# Patient Record
Sex: MALE | Race: WHITE | Employment: FULL TIME | URBAN - METROPOLITAN AREA
[De-identification: names, ages, dates, MRNs, and addresses within clinical notes are randomized per-mention and may not be internally consistent; named-entity substitution may affect disease eponyms.]

---

## 2017-01-27 ENCOUNTER — OFFICE VISIT (OUTPATIENT)
Dept: FAMILY MEDICINE CLINIC | Facility: CLINIC | Age: 42
End: 2017-01-27

## 2017-01-27 VITALS
BODY MASS INDEX: 27.49 KG/M2 | HEIGHT: 70 IN | SYSTOLIC BLOOD PRESSURE: 118 MMHG | HEART RATE: 68 BPM | WEIGHT: 192 LBS | TEMPERATURE: 98 F | OXYGEN SATURATION: 98 % | RESPIRATION RATE: 18 BRPM | DIASTOLIC BLOOD PRESSURE: 80 MMHG

## 2017-01-27 DIAGNOSIS — J01.00 ACUTE NON-RECURRENT MAXILLARY SINUSITIS: ICD-10-CM

## 2017-01-27 DIAGNOSIS — J02.9 SORE THROAT: Primary | ICD-10-CM

## 2017-01-27 LAB — CONTROL LINE PRESENT WITH A CLEAR BACKGROUND (YES/NO): YES YES/NO

## 2017-01-27 PROCEDURE — 99213 OFFICE O/P EST LOW 20 MIN: CPT | Performed by: NURSE PRACTITIONER

## 2017-01-27 PROCEDURE — 87880 STREP A ASSAY W/OPTIC: CPT | Performed by: NURSE PRACTITIONER

## 2017-01-27 RX ORDER — AMOXICILLIN AND CLAVULANATE POTASSIUM 875; 125 MG/1; MG/1
1 TABLET, FILM COATED ORAL 2 TIMES DAILY
Qty: 20 TABLET | Refills: 0 | Status: SHIPPED | OUTPATIENT
Start: 2017-01-27 | End: 2017-02-06

## 2017-04-28 PROBLEM — M77.8 CAPSULITIS OF FOOT, LEFT: Status: ACTIVE | Noted: 2017-04-28

## 2018-03-01 ENCOUNTER — OFFICE VISIT (OUTPATIENT)
Dept: FAMILY MEDICINE CLINIC | Facility: CLINIC | Age: 43
End: 2018-03-01

## 2018-03-01 ENCOUNTER — LAB ENCOUNTER (OUTPATIENT)
Dept: LAB | Age: 43
End: 2018-03-01
Attending: FAMILY MEDICINE
Payer: COMMERCIAL

## 2018-03-01 VITALS
HEART RATE: 60 BPM | BODY MASS INDEX: 29.84 KG/M2 | DIASTOLIC BLOOD PRESSURE: 76 MMHG | WEIGHT: 199.19 LBS | HEIGHT: 68.5 IN | SYSTOLIC BLOOD PRESSURE: 114 MMHG | RESPIRATION RATE: 16 BRPM

## 2018-03-01 DIAGNOSIS — E79.0 HYPERURICEMIA: ICD-10-CM

## 2018-03-01 DIAGNOSIS — Z00.00 ROUTINE GENERAL MEDICAL EXAMINATION AT A HEALTH CARE FACILITY: Primary | ICD-10-CM

## 2018-03-01 DIAGNOSIS — Z12.5 ENCOUNTER FOR SCREENING FOR MALIGNANT NEOPLASM OF PROSTATE: ICD-10-CM

## 2018-03-01 DIAGNOSIS — Z00.00 LABORATORY EXAM ORDERED AS PART OF ROUTINE GENERAL MEDICAL EXAMINATION: ICD-10-CM

## 2018-03-01 LAB
25-HYDROXYVITAMIN D (TOTAL): 23 NG/ML (ref 30–100)
ALBUMIN SERPL-MCNC: 4.3 G/DL (ref 3.5–4.8)
ALP LIVER SERPL-CCNC: 52 U/L (ref 45–117)
ALT SERPL-CCNC: 27 U/L (ref 17–63)
AST SERPL-CCNC: 18 U/L (ref 15–41)
BASOPHILS # BLD AUTO: 0.03 X10(3) UL (ref 0–0.1)
BASOPHILS NFR BLD AUTO: 0.6 %
BILIRUB SERPL-MCNC: 0.5 MG/DL (ref 0.1–2)
BUN BLD-MCNC: 11 MG/DL (ref 8–20)
CALCIUM BLD-MCNC: 9.3 MG/DL (ref 8.3–10.3)
CHLORIDE: 102 MMOL/L (ref 101–111)
CHOLEST SMN-MCNC: 255 MG/DL (ref ?–200)
CO2: 28 MMOL/L (ref 22–32)
COMPLEXED PSA SERPL-MCNC: 0.86 NG/ML (ref 0.01–4)
CREAT BLD-MCNC: 0.91 MG/DL (ref 0.7–1.3)
EOSINOPHIL # BLD AUTO: 0.1 X10(3) UL (ref 0–0.3)
EOSINOPHIL NFR BLD AUTO: 1.9 %
ERYTHROCYTE [DISTWIDTH] IN BLOOD BY AUTOMATED COUNT: 11.9 % (ref 11.5–16)
FREE T4: 1 NG/DL (ref 0.9–1.8)
GLUCOSE BLD-MCNC: 95 MG/DL (ref 70–99)
HCT VFR BLD AUTO: 39.4 % (ref 37–53)
HDLC SERPL-MCNC: 60 MG/DL (ref 45–?)
HDLC SERPL: 4.25 {RATIO} (ref ?–4.97)
HGB BLD-MCNC: 13.3 G/DL (ref 13–17)
IMMATURE GRANULOCYTE COUNT: 0.01 X10(3) UL (ref 0–1)
IMMATURE GRANULOCYTE RATIO %: 0.2 %
LDLC SERPL CALC-MCNC: 152 MG/DL (ref ?–130)
LYMPHOCYTES # BLD AUTO: 2.26 X10(3) UL (ref 0.9–4)
LYMPHOCYTES NFR BLD AUTO: 42.6 %
M PROTEIN MFR SERPL ELPH: 8.3 G/DL (ref 6.1–8.3)
MCH RBC QN AUTO: 29.1 PG (ref 27–33.2)
MCHC RBC AUTO-ENTMCNC: 33.8 G/DL (ref 31–37)
MCV RBC AUTO: 86.2 FL (ref 80–99)
MONOCYTES # BLD AUTO: 0.46 X10(3) UL (ref 0.1–1)
MONOCYTES NFR BLD AUTO: 8.7 %
NEUTROPHIL ABS PRELIM: 2.44 X10 (3) UL (ref 1.3–6.7)
NEUTROPHILS # BLD AUTO: 2.44 X10(3) UL (ref 1.3–6.7)
NEUTROPHILS NFR BLD AUTO: 46 %
NONHDLC SERPL-MCNC: 195 MG/DL (ref ?–130)
PLATELET # BLD AUTO: 238 10(3)UL (ref 150–450)
POTASSIUM SERPL-SCNC: 4.4 MMOL/L (ref 3.6–5.1)
RBC # BLD AUTO: 4.57 X10(6)UL (ref 4.3–5.7)
RED CELL DISTRIBUTION WIDTH-SD: 37.8 FL (ref 35.1–46.3)
SODIUM SERPL-SCNC: 138 MMOL/L (ref 136–144)
TRIGL SERPL-MCNC: 213 MG/DL (ref ?–150)
TSI SER-ACNC: 2.86 MIU/ML (ref 0.35–5.5)
URIC ACID: 7.8 MG/DL (ref 2.4–8.7)
VLDLC SERPL CALC-MCNC: 43 MG/DL (ref 5–40)
WBC # BLD AUTO: 5.3 X10(3) UL (ref 4–13)

## 2018-03-01 PROCEDURE — 84439 ASSAY OF FREE THYROXINE: CPT | Performed by: FAMILY MEDICINE

## 2018-03-01 PROCEDURE — 80050 GENERAL HEALTH PANEL: CPT | Performed by: FAMILY MEDICINE

## 2018-03-01 PROCEDURE — 99396 PREV VISIT EST AGE 40-64: CPT | Performed by: FAMILY MEDICINE

## 2018-03-01 PROCEDURE — 82306 VITAMIN D 25 HYDROXY: CPT | Performed by: FAMILY MEDICINE

## 2018-03-01 PROCEDURE — 80061 LIPID PANEL: CPT | Performed by: FAMILY MEDICINE

## 2018-03-01 PROCEDURE — 84153 ASSAY OF PSA TOTAL: CPT | Performed by: FAMILY MEDICINE

## 2018-03-01 PROCEDURE — 36415 COLL VENOUS BLD VENIPUNCTURE: CPT | Performed by: FAMILY MEDICINE

## 2018-03-01 PROCEDURE — 84550 ASSAY OF BLOOD/URIC ACID: CPT | Performed by: FAMILY MEDICINE

## 2018-03-01 RX ORDER — PROPRANOLOL/HYDROCHLOROTHIAZID 40 MG-25MG
1 TABLET ORAL DAILY
COMMUNITY
Start: 2017-11-01 | End: 2019-06-05

## 2018-03-01 NOTE — PATIENT INSTRUCTIONS
SCHEDULING [facility] APPOINTMENTS ONLINE    Lab appointments can now be scheduled online at [email or web address]    · Go to [email or web address]  · In Search type Lab  · Click \"Lab services\"  · Click \"Schedule Your Test Online\"  · Follow the prompts  · If you if a member of your immediate family has had colon cancer, especially if their cancer occurred before they were 48years old. Prostate cancer tests:  The older way of looking for prostate cancer, the rectal exam, is no longer regarded as the best way to s tests, and urine tests. When you have no symptoms of illness, you should discuss the pros and cons of these and other tests with your healthcare provider. Each test involves some expense. What shots do I need?    The following shots are recommended for ad should expect your healthcare provider to advise you regularly on other ways to stay healthy. Some of these may include:   Substance use: Do not use tobacco or illegal drugs.  Avoid using alcohol while driving, swimming, boating, etc.   Diet and exercise: T

## 2018-03-01 NOTE — PROGRESS NOTES
Piotr Anne is a 40year old male who presents for a complete physical exam.   HPI:     No complaints.     Wt Readings from Last 6 Encounters:  03/01/18 : 199 lb 3.2 oz  04/28/17 : 195 lb  03/22/17 : 195 lb 9.6 oz  01/27/17 : 192 lb  12/30/16 : 192 l denies MEIR, wheezing, cough, orthopnea and PND  CARDIOVASCULAR: denies CP, palpitations, rapid or slow heart rate.  Denies CAROLINA/lower extremity swelling  GI: denies abdominal pain,denies heartburn, denies n/v/c/d/change in stools/blood in stool/black stool/c old male who presents for a complete physical exam.       Routine general medical examination at a health care facility  (primary encounter diagnosis)  Laboratory exam ordered as part of routine general medical examination  Encounter for screening for [facility]

## 2018-03-09 ENCOUNTER — TELEPHONE (OUTPATIENT)
Dept: FAMILY MEDICINE CLINIC | Facility: CLINIC | Age: 43
End: 2018-03-09

## 2018-03-09 NOTE — TELEPHONE ENCOUNTER
----- Message from Berenice Pallas, DO sent at 3/8/2018  6:36 PM CST -----  Please call patient: Please have patient make appointment to see me for follow-up on blood test results.   Need to discuss cardiovascular risk factors and cardiovascular risk factor

## 2018-03-09 NOTE — TELEPHONE ENCOUNTER
LM on private voicemail, per signed consent, with test results recommendations. Pt advised to call the office to schedule an appointment to discuss test results.

## 2018-04-02 ENCOUNTER — OFFICE VISIT (OUTPATIENT)
Dept: FAMILY MEDICINE CLINIC | Facility: CLINIC | Age: 43
End: 2018-04-02

## 2018-04-02 VITALS
HEIGHT: 68.5 IN | DIASTOLIC BLOOD PRESSURE: 64 MMHG | RESPIRATION RATE: 16 BRPM | HEART RATE: 64 BPM | WEIGHT: 198.38 LBS | SYSTOLIC BLOOD PRESSURE: 112 MMHG | BODY MASS INDEX: 29.72 KG/M2

## 2018-04-02 DIAGNOSIS — E55.9 VITAMIN D DEFICIENCY: ICD-10-CM

## 2018-04-02 DIAGNOSIS — E66.3 OVERWEIGHT (BMI 25.0-29.9): ICD-10-CM

## 2018-04-02 DIAGNOSIS — E78.2 MIXED HYPERLIPIDEMIA: Primary | ICD-10-CM

## 2018-04-02 PROCEDURE — 99213 OFFICE O/P EST LOW 20 MIN: CPT | Performed by: FAMILY MEDICINE

## 2018-04-02 NOTE — PROGRESS NOTES
Dewayne Rodriguez is a 40year old male.      HPI:       Mixed hyperlipidemia:  Patient had normal lipid panels in the past.  Patient states over the last several months he has been indulgent with his diet and not as physically active as in the past.  He i MEIR/CAROLINA/Orthopnea/PND  CARDIOVASCULAR: Denies CP/palpitations/CAROLINA  VASCULAR: Denies edema, denies claudication type symptoms, varicose veins  GI: Denies abdominal pain/nausea/vomiting/blood in stool/black stool/bloating/constipation/diarrhea  PSYCH: denies (BMI 25.0-29. 9)  Weight loss recommended. Patient counseled on healthy diet and regular exercise. Orders Placed This Encounter      Lipid Panel [E]    Return in about 3 months (around 7/2/2018) for Hyperlipidemia after recheck of blood tests.

## 2018-10-17 ENCOUNTER — APPOINTMENT (OUTPATIENT)
Dept: LAB | Age: 43
End: 2018-10-17
Attending: FAMILY MEDICINE
Payer: COMMERCIAL

## 2018-10-17 DIAGNOSIS — E78.2 MIXED HYPERLIPIDEMIA: ICD-10-CM

## 2018-10-17 PROCEDURE — 80061 LIPID PANEL: CPT | Performed by: FAMILY MEDICINE

## 2018-10-17 PROCEDURE — 36415 COLL VENOUS BLD VENIPUNCTURE: CPT | Performed by: FAMILY MEDICINE

## 2018-11-01 ENCOUNTER — OFFICE VISIT (OUTPATIENT)
Dept: FAMILY MEDICINE CLINIC | Facility: CLINIC | Age: 43
End: 2018-11-01
Payer: COMMERCIAL

## 2018-11-01 VITALS
HEART RATE: 64 BPM | SYSTOLIC BLOOD PRESSURE: 112 MMHG | WEIGHT: 202 LBS | DIASTOLIC BLOOD PRESSURE: 70 MMHG | TEMPERATURE: 98 F | BODY MASS INDEX: 30.62 KG/M2 | HEIGHT: 68 IN

## 2018-11-01 DIAGNOSIS — G89.29 CHRONIC PAIN OF BOTH ANKLES: ICD-10-CM

## 2018-11-01 DIAGNOSIS — E78.2 MIXED HYPERLIPIDEMIA: Primary | ICD-10-CM

## 2018-11-01 DIAGNOSIS — M25.572 CHRONIC PAIN OF BOTH ANKLES: ICD-10-CM

## 2018-11-01 DIAGNOSIS — M79.672 CHRONIC FOOT PAIN, LEFT: ICD-10-CM

## 2018-11-01 DIAGNOSIS — G89.29 CHRONIC FOOT PAIN, RIGHT: ICD-10-CM

## 2018-11-01 DIAGNOSIS — M79.671 CHRONIC FOOT PAIN, RIGHT: ICD-10-CM

## 2018-11-01 DIAGNOSIS — G89.29 CHRONIC FOOT PAIN, LEFT: ICD-10-CM

## 2018-11-01 DIAGNOSIS — M25.571 CHRONIC PAIN OF BOTH ANKLES: ICD-10-CM

## 2018-11-01 PROCEDURE — 99214 OFFICE O/P EST MOD 30 MIN: CPT | Performed by: FAMILY MEDICINE

## 2018-11-01 PROCEDURE — 90686 IIV4 VACC NO PRSV 0.5 ML IM: CPT | Performed by: FAMILY MEDICINE

## 2018-11-01 PROCEDURE — 90471 IMMUNIZATION ADMIN: CPT | Performed by: FAMILY MEDICINE

## 2018-11-01 NOTE — PROGRESS NOTES
Eagle Roca is a 44year old male. HPI:       Hypercholesterolemia:  Family history of stroke in a grandparent. Patient has been working on his diet to help improve abnormal lipids.   Patient states that normally he used to run in the past, tanner unusual skin lesions or rashes   RESPIRATORY: Denies: MEIR/CAROLINA/Cough/Wheeze/Orthopnea/PND  CARDIOVASCULAR: Denies CP/palpitations/CAROLINA  VASCULAR: Denies LE edema, denies claudication type symptoms, varicose veins  NEURO: denies headaches/dizziness/fainting/w pain of both ankles  Left greater than right. Consult Dr. Chaitanya Hanson.    - [address]    3. Chronic foot pain, left  Left greater than right. Consult Dr. Chaitanya Hanson.    - [address]    4.  Chronic foot pain, right  Left greater than righ

## 2019-01-25 ENCOUNTER — APPOINTMENT (OUTPATIENT)
Dept: LAB | Age: 44
End: 2019-01-25
Attending: INTERNAL MEDICINE
Payer: COMMERCIAL

## 2019-01-25 DIAGNOSIS — M25.50 POLYARTHRALGIA: ICD-10-CM

## 2019-01-25 LAB
CRP SERPL-MCNC: 2.01 MG/DL (ref ?–1)
SED RATE-ML: 18 MM/HR (ref 0–12)

## 2019-01-25 PROCEDURE — 86140 C-REACTIVE PROTEIN: CPT

## 2019-01-25 PROCEDURE — 36415 COLL VENOUS BLD VENIPUNCTURE: CPT

## 2019-01-25 PROCEDURE — 85652 RBC SED RATE AUTOMATED: CPT

## 2019-04-03 ENCOUNTER — LAB ENCOUNTER (OUTPATIENT)
Dept: LAB | Age: 44
End: 2019-04-03
Attending: INTERNAL MEDICINE
Payer: COMMERCIAL

## 2019-04-03 DIAGNOSIS — M1A.09X0 IDIOPATHIC CHRONIC GOUT OF MULTIPLE SITES WITHOUT TOPHUS: ICD-10-CM

## 2019-04-03 PROCEDURE — 84550 ASSAY OF BLOOD/URIC ACID: CPT

## 2019-04-03 PROCEDURE — 85025 COMPLETE CBC W/AUTO DIFF WBC: CPT

## 2019-04-03 PROCEDURE — 80053 COMPREHEN METABOLIC PANEL: CPT

## 2019-04-03 PROCEDURE — 36415 COLL VENOUS BLD VENIPUNCTURE: CPT

## 2019-05-30 ENCOUNTER — LAB ENCOUNTER (OUTPATIENT)
Dept: LAB | Age: 44
End: 2019-05-30
Attending: INTERNAL MEDICINE
Payer: COMMERCIAL

## 2019-05-30 DIAGNOSIS — M1A.09X0 IDIOPATHIC CHRONIC GOUT OF MULTIPLE SITES WITHOUT TOPHUS: ICD-10-CM

## 2019-05-30 PROCEDURE — 36415 COLL VENOUS BLD VENIPUNCTURE: CPT

## 2019-05-30 PROCEDURE — 84550 ASSAY OF BLOOD/URIC ACID: CPT

## 2019-05-30 PROCEDURE — 80053 COMPREHEN METABOLIC PANEL: CPT

## 2019-05-30 PROCEDURE — 85025 COMPLETE CBC W/AUTO DIFF WBC: CPT

## 2019-06-05 ENCOUNTER — OFFICE VISIT (OUTPATIENT)
Dept: FAMILY MEDICINE CLINIC | Facility: CLINIC | Age: 44
End: 2019-06-05
Payer: COMMERCIAL

## 2019-06-05 VITALS
OXYGEN SATURATION: 99 % | WEIGHT: 205 LBS | HEIGHT: 70 IN | HEART RATE: 68 BPM | DIASTOLIC BLOOD PRESSURE: 72 MMHG | SYSTOLIC BLOOD PRESSURE: 110 MMHG | BODY MASS INDEX: 29.35 KG/M2

## 2019-06-05 DIAGNOSIS — Z00.00 ROUTINE GENERAL MEDICAL EXAMINATION AT A HEALTH CARE FACILITY: Primary | ICD-10-CM

## 2019-06-05 DIAGNOSIS — E66.3 OVERWEIGHT (BMI 25.0-29.9): ICD-10-CM

## 2019-06-05 DIAGNOSIS — Z12.5 SCREENING FOR MALIGNANT NEOPLASM OF PROSTATE: ICD-10-CM

## 2019-06-05 PROCEDURE — 99396 PREV VISIT EST AGE 40-64: CPT | Performed by: FAMILY MEDICINE

## 2019-06-05 RX ORDER — MULTIVIT-MIN/IRON/FOLIC ACID/K 18-600-40
2000 CAPSULE ORAL DAILY
COMMUNITY
End: 2021-05-07

## 2019-06-05 NOTE — PROGRESS NOTES
Rebecca Nolen is a 44year old male   HPI:       No new complaints.       Wt Readings from Last 6 Encounters:  06/05/19 : 205 lb  06/03/19 : 206 lb  02/18/19 : 201 lb 9.6 oz  02/04/19 : 202 lb  11/12/18 : 195 lb  11/12/18 : 195 lb    Body mass index is per week for 40 mins.   Diet: watches minimally     REVIEW OF SYSTEMS:   GENERAL: feels well overall, denies fever or chills, denies change in weight  SKIN: denies any unusual skin lesions  EYES: denies changes in vision  HENT: denies upper respiratory symp normal.  PSYCH: normal affect, no apparent thought disorder, average judgement and insight      Immunization History  Administered            Date(s) Administered    FLULAVAL 6 months & older 0.5 ml Prefilled syringe (44663)                          11/01/ plan.  The patient is asked to return for CPX in 12 months and as needed. Also, for nurse visit in the Fall for influenza immunization.

## 2019-12-04 ENCOUNTER — LAB ENCOUNTER (OUTPATIENT)
Dept: LAB | Age: 44
End: 2019-12-04
Attending: INTERNAL MEDICINE
Payer: COMMERCIAL

## 2019-12-04 DIAGNOSIS — M1A.09X0 IDIOPATHIC CHRONIC GOUT OF MULTIPLE SITES WITHOUT TOPHUS: ICD-10-CM

## 2019-12-04 PROCEDURE — 84550 ASSAY OF BLOOD/URIC ACID: CPT

## 2019-12-04 PROCEDURE — 36415 COLL VENOUS BLD VENIPUNCTURE: CPT

## 2019-12-04 PROCEDURE — 80053 COMPREHEN METABOLIC PANEL: CPT

## 2019-12-04 PROCEDURE — 82248 BILIRUBIN DIRECT: CPT

## 2019-12-04 PROCEDURE — 85025 COMPLETE CBC W/AUTO DIFF WBC: CPT

## 2020-03-02 ENCOUNTER — OFFICE VISIT (OUTPATIENT)
Dept: FAMILY MEDICINE CLINIC | Facility: CLINIC | Age: 45
End: 2020-03-02
Payer: COMMERCIAL

## 2020-03-02 VITALS
TEMPERATURE: 99 F | OXYGEN SATURATION: 99 % | DIASTOLIC BLOOD PRESSURE: 70 MMHG | BODY MASS INDEX: 28.06 KG/M2 | HEART RATE: 80 BPM | HEIGHT: 70 IN | WEIGHT: 196 LBS | SYSTOLIC BLOOD PRESSURE: 118 MMHG

## 2020-03-02 DIAGNOSIS — M25.512 ACUTE PAIN OF LEFT SHOULDER: Primary | ICD-10-CM

## 2020-03-02 DIAGNOSIS — Z23 NEED FOR VACCINATION: ICD-10-CM

## 2020-03-02 PROCEDURE — 99214 OFFICE O/P EST MOD 30 MIN: CPT | Performed by: FAMILY MEDICINE

## 2020-03-02 NOTE — PROGRESS NOTES
Anibal Cassidy is a 38year old male. HPI:     Roomer's notes read and reviewed with patient. Shoulder pain:  Left. Duration approximately a month. Patient points to superior portion of shoulder. Nature of the pain is a achiness.   Pain up t Denies: MEIR/CAROLINA/Cough/Wheeze  CARDIOVASCULAR: Denies CP/palpitations  NEURO: denies headaches/dizziness/fainting/weakness/change in vision.   Denies numbness or tingling of upper extremities  PSYCH: denies depression and anxiety      Immunization History  A ML  OP REFERRAL TO [facility]    Return in about 3 months (around 6/5/2020) for Annual Wellness Visit and as needed or indicated.

## 2020-03-02 NOTE — PATIENT INSTRUCTIONS
-If shoulder pain does not improve and resolve with the exercises, proceed with physical therapy.   -If physical therapy does not work, please call to let Dr. Vladimir Garrett know so we can refer you to a specialist.

## 2020-03-04 ENCOUNTER — HOSPITAL ENCOUNTER (OUTPATIENT)
Dept: GENERAL RADIOLOGY | Age: 45
Discharge: HOME | End: 2020-03-04
Attending: INTERNAL MEDICINE
Payer: COMMERCIAL

## 2020-03-04 ENCOUNTER — APPOINTMENT (OUTPATIENT)
Dept: LAB | Age: 45
End: 2020-03-04
Attending: INTERNAL MEDICINE
Payer: COMMERCIAL

## 2020-03-04 DIAGNOSIS — M25.531 RIGHT WRIST PAIN: ICD-10-CM

## 2020-03-04 PROCEDURE — 86618 LYME DISEASE ANTIBODY: CPT

## 2020-03-04 PROCEDURE — 73110 X-RAY EXAM OF WRIST: CPT | Performed by: INTERNAL MEDICINE

## 2020-03-04 PROCEDURE — 36415 COLL VENOUS BLD VENIPUNCTURE: CPT

## 2020-03-06 LAB — B BURGDOR IGG+IGM SER QL: NEGATIVE

## 2020-10-20 ENCOUNTER — OFFICE VISIT (OUTPATIENT)
Dept: FAMILY MEDICINE CLINIC | Facility: CLINIC | Age: 45
End: 2020-10-20
Payer: COMMERCIAL

## 2020-10-20 VITALS
SYSTOLIC BLOOD PRESSURE: 139 MMHG | TEMPERATURE: 96 F | DIASTOLIC BLOOD PRESSURE: 76 MMHG | RESPIRATION RATE: 18 BRPM | HEART RATE: 91 BPM | OXYGEN SATURATION: 99 %

## 2020-10-20 DIAGNOSIS — J06.9 UPPER RESPIRATORY VIRUS: Primary | ICD-10-CM

## 2020-10-20 PROCEDURE — 3075F SYST BP GE 130 - 139MM HG: CPT | Performed by: NURSE PRACTITIONER

## 2020-10-20 PROCEDURE — 99213 OFFICE O/P EST LOW 20 MIN: CPT | Performed by: NURSE PRACTITIONER

## 2020-10-20 PROCEDURE — 3078F DIAST BP <80 MM HG: CPT | Performed by: NURSE PRACTITIONER

## 2020-10-20 NOTE — PROGRESS NOTES
CHIEF COMPLAINT:   Cough, nasal congestion, chest tightness, bodyaches x1 day    HPI:   Chloe Floyd is a 41year old male who presents for upper respiratory symptoms for  1 days.  Patient reports congestion, low grade fever, cough with unknown colore intact  EARS: unremarkable  NOSE: Nostrils patent, clear nasal discharge, nasal mucosa pink and swollen  THROAT: Oral mucosa pink, moist. Posterior pharynx is patent.    NECK: Supple, non-tender  LUNGS: clear to auscultation bilaterally, no wheezes or rhonc

## 2020-11-03 ENCOUNTER — IMMUNIZATION (OUTPATIENT)
Dept: FAMILY MEDICINE CLINIC | Facility: CLINIC | Age: 45
End: 2020-11-03
Payer: COMMERCIAL

## 2020-11-03 DIAGNOSIS — Z23 NEED FOR VACCINATION: ICD-10-CM

## 2020-11-03 PROCEDURE — 90471 IMMUNIZATION ADMIN: CPT | Performed by: FAMILY MEDICINE

## 2020-11-03 PROCEDURE — 90686 IIV4 VACC NO PRSV 0.5 ML IM: CPT | Performed by: FAMILY MEDICINE

## 2021-05-07 ENCOUNTER — OFFICE VISIT (OUTPATIENT)
Dept: FAMILY MEDICINE CLINIC | Facility: CLINIC | Age: 46
End: 2021-05-07
Payer: COMMERCIAL

## 2021-05-07 VITALS
TEMPERATURE: 97 F | DIASTOLIC BLOOD PRESSURE: 70 MMHG | BODY MASS INDEX: 27.06 KG/M2 | HEIGHT: 70 IN | HEART RATE: 77 BPM | OXYGEN SATURATION: 96 % | WEIGHT: 189 LBS | SYSTOLIC BLOOD PRESSURE: 120 MMHG

## 2021-05-07 DIAGNOSIS — M77.9 ENTHESOPATHY: ICD-10-CM

## 2021-05-07 DIAGNOSIS — M54.9 MUSCULOSKELETAL BACK PAIN: ICD-10-CM

## 2021-05-07 DIAGNOSIS — R19.4 CHANGE IN BOWEL HABITS: ICD-10-CM

## 2021-05-07 DIAGNOSIS — E66.3 OVERWEIGHT WITH BODY MASS INDEX (BMI) OF 27 TO 27.9 IN ADULT: ICD-10-CM

## 2021-05-07 DIAGNOSIS — M79.18 RIGHT BUTTOCK PAIN: ICD-10-CM

## 2021-05-07 DIAGNOSIS — Z12.5 SCREENING FOR MALIGNANT NEOPLASM OF PROSTATE: ICD-10-CM

## 2021-05-07 DIAGNOSIS — Z00.00 ROUTINE GENERAL MEDICAL EXAMINATION AT A HEALTH CARE FACILITY: Primary | ICD-10-CM

## 2021-05-07 DIAGNOSIS — M1A.09X0 IDIOPATHIC CHRONIC GOUT OF MULTIPLE SITES WITHOUT TOPHUS: ICD-10-CM

## 2021-05-07 PROCEDURE — 3074F SYST BP LT 130 MM HG: CPT | Performed by: FAMILY MEDICINE

## 2021-05-07 PROCEDURE — 99214 OFFICE O/P EST MOD 30 MIN: CPT | Performed by: FAMILY MEDICINE

## 2021-05-07 PROCEDURE — 99396 PREV VISIT EST AGE 40-64: CPT | Performed by: FAMILY MEDICINE

## 2021-05-07 PROCEDURE — 3078F DIAST BP <80 MM HG: CPT | Performed by: FAMILY MEDICINE

## 2021-05-07 PROCEDURE — 3008F BODY MASS INDEX DOCD: CPT | Performed by: FAMILY MEDICINE

## 2021-05-07 NOTE — PROGRESS NOTES
García Baum is a 41year old male   HPI:       Intentional weight loss of 7# since last ov 3/2020 due to intermittent fasting. Gout:  still having joint pain intermittently.     Back pain:  New areas of pain right low back, points to right iliac c Smoking status: Never Smoker      Smokeless tobacco: Never Used    Vaping Use      Vaping Use: Never used    Alcohol use: Not Currently      Alcohol/week: 5.0 standard drinks      Types: 6 Cans of beer per week      Comment: weekends    Drug use:  No wheezes/ronchi/rales/crackles, normal air excursion  CARDIOVASCULAR: RRR, no murmur, no lower extremity edema  GI: non-distended, non-tender to palpation, no HSM/masses/pulsations  MUSCULOSKELETAL: Lumbar: Nontender to palpation.   Possible mild tenderness Potassium      3.5 - 5.1 mmol/L      Chloride      98 - 107 mmol/L      Carbon Dioxide, Total      21 - 32 mmol/L      CALCIUM      8.5 - 10.1 mg/dL      TOTAL PROTEIN      6.4 - 8.2 g/dL      Albumin      3.4 - 5.0 g/dL      Total Bilirubin      0.20 - 10*3/uL      Basophils Absolute      0.00 - 0.20 10*3/uL      nRBC Absolute      0.000 - 0.012 10*3/uL      Neutrophils %      %      Lymphocytes %      %      Monocytes %      %      Eosinophils %      %      Basophils %      %      nRBC/100 WBC      /100 MCV      79.0 - 99.0 fL 83.2    MCH      26.0 - 32.5 pg 29.3    MCHC      31.8 - 36.0 g/dL 35.3    Platelet Count      744 - 450 10*3/uL 270    RDW      11.2 - 14.4 % 12.0    MEAN PLATELET VOLUME      7.0 - 11.5 fL 10.7    Neutrophils Absolute      1.70 - 100.00 ng/mL 26.54 (L)    Scleroderma (Scl-70) (RADHA) Antibody, IgG      0 - 40 AU/mL 11    Beltran (RADHA) Antibody, IgG      0 - 40 AU/mL 0    Ribonucleic Protein (U1) (RADHA) Ab, IgG      0 - 40 AU/mL 1    SSB (La) (RADHA) Antibody, IgG      0 - 40 AU/mL 0 five days a week for cardiovascular fitness and 45-60 minutes 6-7 days a week for weight loss.          Orders Placed This Encounter      CBC [0099] [Q]      COMP METABOLIC PANEL [77678] [Q]      LIPID PANEL [7600] [Q]      TSH [899] [Q]      T4 FREE Jose Friedman

## 2021-05-08 PROBLEM — Z00.00 ROUTINE GENERAL MEDICAL EXAMINATION AT A HEALTH CARE FACILITY: Status: RESOLVED | Noted: 2018-03-01 | Resolved: 2021-05-08

## 2021-05-08 PROBLEM — M25.512 ACUTE PAIN OF LEFT SHOULDER: Status: RESOLVED | Noted: 2020-03-02 | Resolved: 2021-05-08

## 2021-05-08 PROBLEM — R19.4 CHANGE IN BOWEL HABITS: Status: ACTIVE | Noted: 2021-05-08

## 2021-05-08 PROBLEM — E66.3 OVERWEIGHT (BMI 25.0-29.9): Status: RESOLVED | Noted: 2019-06-05 | Resolved: 2021-05-08

## 2021-05-08 PROBLEM — M77.8 CAPSULITIS OF FOOT, LEFT: Status: RESOLVED | Noted: 2017-04-28 | Resolved: 2021-05-08

## 2021-05-28 ENCOUNTER — LAB ENCOUNTER (OUTPATIENT)
Dept: LAB | Age: 46
End: 2021-05-28
Attending: FAMILY MEDICINE
Payer: COMMERCIAL

## 2021-05-28 DIAGNOSIS — Z12.5 SCREENING FOR MALIGNANT NEOPLASM OF PROSTATE: Primary | ICD-10-CM

## 2021-05-28 DIAGNOSIS — M1A.09X0 IDIOPATHIC CHRONIC GOUT OF MULTIPLE SITES WITHOUT TOPHUS: ICD-10-CM

## 2021-05-28 PROCEDURE — 36415 COLL VENOUS BLD VENIPUNCTURE: CPT | Performed by: FAMILY MEDICINE

## 2021-05-28 PROCEDURE — 84439 ASSAY OF FREE THYROXINE: CPT | Performed by: FAMILY MEDICINE

## 2021-05-28 PROCEDURE — 82248 BILIRUBIN DIRECT: CPT

## 2021-05-28 PROCEDURE — 84443 ASSAY THYROID STIM HORMONE: CPT | Performed by: FAMILY MEDICINE

## 2021-05-28 PROCEDURE — 84550 ASSAY OF BLOOD/URIC ACID: CPT

## 2021-05-28 PROCEDURE — 80053 COMPREHEN METABOLIC PANEL: CPT | Performed by: FAMILY MEDICINE

## 2021-05-28 PROCEDURE — 80061 LIPID PANEL: CPT | Performed by: FAMILY MEDICINE

## 2021-05-28 PROCEDURE — 85025 COMPLETE CBC W/AUTO DIFF WBC: CPT

## 2021-05-28 PROCEDURE — 84154 ASSAY OF PSA FREE: CPT

## 2021-05-28 PROCEDURE — 84153 ASSAY OF PSA TOTAL: CPT

## 2021-07-26 ENCOUNTER — HOSPITAL ENCOUNTER (INPATIENT)
Facility: HOSPITAL | Age: 46
LOS: 6 days | Discharge: HOME | DRG: 558 | End: 2021-08-01
Attending: EMERGENCY MEDICINE | Admitting: HOSPITALIST
Payer: COMMERCIAL

## 2021-07-26 ENCOUNTER — TELEPHONE (OUTPATIENT)
Dept: FAMILY MEDICINE CLINIC | Facility: CLINIC | Age: 46
End: 2021-07-26

## 2021-07-26 DIAGNOSIS — M62.82 NON-TRAUMATIC RHABDOMYOLYSIS: Primary | ICD-10-CM

## 2021-07-26 PROBLEM — E87.1 HYPONATREMIA: Status: ACTIVE | Noted: 2021-07-26

## 2021-07-26 LAB
ALBUMIN SERPL-MCNC: 3.9 G/DL (ref 3.4–5)
ALBUMIN/GLOB SERPL: 1.1 {RATIO} (ref 1–2)
ALP LIVER SERPL-CCNC: 47 U/L
ALT SERPL-CCNC: 426 U/L
ANION GAP SERPL CALC-SCNC: 5 MMOL/L (ref 0–18)
AST SERPL-CCNC: 2754 U/L (ref 15–37)
ATRIAL RATE: 63 BPM
BASOPHILS # BLD AUTO: 0.01 X10(3) UL (ref 0–0.2)
BASOPHILS NFR BLD AUTO: 0.1 %
BILIRUB SERPL-MCNC: 0.7 MG/DL (ref 0.1–2)
BILIRUB UR QL STRIP.AUTO: NEGATIVE
BUN BLD-MCNC: 15 MG/DL (ref 7–18)
BUN/CREAT SERPL: 14.2 (ref 10–20)
CALCIUM BLD-MCNC: 8.2 MG/DL (ref 8.5–10.1)
CHLORIDE SERPL-SCNC: 103 MMOL/L (ref 98–112)
CK SERPL-CCNC: ABNORMAL U/L
CLARITY UR REFRACT.AUTO: CLEAR
CO2 SERPL-SCNC: 25 MMOL/L (ref 21–32)
COLOR UR AUTO: YELLOW
CREAT BLD-MCNC: 1.06 MG/DL
DEPRECATED RDW RBC AUTO: 39.4 FL (ref 35.1–46.3)
EOSINOPHIL # BLD AUTO: 0.02 X10(3) UL (ref 0–0.7)
EOSINOPHIL NFR BLD AUTO: 0.2 %
ERYTHROCYTE [DISTWIDTH] IN BLOOD BY AUTOMATED COUNT: 12.6 % (ref 11–15)
GLOBULIN PLAS-MCNC: 3.4 G/DL (ref 2.8–4.4)
GLUCOSE BLD-MCNC: 102 MG/DL (ref 70–99)
GLUCOSE UR STRIP.AUTO-MCNC: NEGATIVE MG/DL
HCT VFR BLD AUTO: 36 %
HGB BLD-MCNC: 12.7 G/DL
IMM GRANULOCYTES # BLD AUTO: 0.02 X10(3) UL (ref 0–1)
IMM GRANULOCYTES NFR BLD: 0.2 %
KETONES UR STRIP.AUTO-MCNC: NEGATIVE MG/DL
LEUKOCYTE ESTERASE UR QL STRIP.AUTO: NEGATIVE
LYMPHOCYTES # BLD AUTO: 1.2 X10(3) UL (ref 1–4)
LYMPHOCYTES NFR BLD AUTO: 14.3 %
M PROTEIN MFR SERPL ELPH: 7.3 G/DL (ref 6.4–8.2)
MCH RBC QN AUTO: 30.3 PG (ref 26–34)
MCHC RBC AUTO-ENTMCNC: 35.3 G/DL (ref 31–37)
MCV RBC AUTO: 85.9 FL
MONOCYTES # BLD AUTO: 0.71 X10(3) UL (ref 0.1–1)
MONOCYTES NFR BLD AUTO: 8.5 %
NEUTROPHILS # BLD AUTO: 6.41 X10 (3) UL (ref 1.5–7.7)
NEUTROPHILS # BLD AUTO: 6.41 X10(3) UL (ref 1.5–7.7)
NEUTROPHILS NFR BLD AUTO: 76.7 %
NITRITE UR QL STRIP.AUTO: NEGATIVE
OSMOLALITY SERPL CALC.SUM OF ELEC: 277 MOSM/KG (ref 275–295)
P AXIS: 38 DEGREES
P-R INTERVAL: 124 MS
PH UR STRIP.AUTO: 6 [PH] (ref 5–8)
PLATELET # BLD AUTO: 209 10(3)UL (ref 150–450)
POTASSIUM SERPL-SCNC: 4.5 MMOL/L (ref 3.5–5.1)
PROT UR STRIP.AUTO-MCNC: 30 MG/DL
Q-T INTERVAL: 426 MS
QRS DURATION: 80 MS
QTC CALCULATION (BEZET): 435 MS
R AXIS: 27 DEGREES
RBC # BLD AUTO: 4.19 X10(6)UL
SODIUM SERPL-SCNC: 133 MMOL/L (ref 136–145)
SP GR UR STRIP.AUTO: 1 (ref 1–1.03)
T AXIS: 25 DEGREES
UROBILINOGEN UR STRIP.AUTO-MCNC: <2 MG/DL
VENTRICULAR RATE: 63 BPM
WBC # BLD AUTO: 8.4 X10(3) UL (ref 4–11)

## 2021-07-26 PROCEDURE — 99222 1ST HOSP IP/OBS MODERATE 55: CPT | Performed by: ORTHOPAEDIC SURGERY

## 2021-07-26 PROCEDURE — 99223 1ST HOSP IP/OBS HIGH 75: CPT | Performed by: HOSPITALIST

## 2021-07-26 RX ORDER — SODIUM CHLORIDE 9 MG/ML
INJECTION, SOLUTION INTRAVENOUS CONTINUOUS
Status: DISCONTINUED | OUTPATIENT
Start: 2021-07-26 | End: 2021-07-29

## 2021-07-26 RX ORDER — PROCHLORPERAZINE EDISYLATE 5 MG/ML
5 INJECTION INTRAMUSCULAR; INTRAVENOUS EVERY 8 HOURS PRN
Status: DISCONTINUED | OUTPATIENT
Start: 2021-07-26 | End: 2021-08-01

## 2021-07-26 RX ORDER — MULTIVITAMIN WITH FOLIC ACID 400 MCG
1 TABLET ORAL DAILY
COMMUNITY

## 2021-07-26 RX ORDER — MORPHINE SULFATE 2 MG/ML
2 INJECTION, SOLUTION INTRAMUSCULAR; INTRAVENOUS ONCE
Status: COMPLETED | OUTPATIENT
Start: 2021-07-26 | End: 2021-07-26

## 2021-07-26 RX ORDER — SODIUM CHLORIDE 9 MG/ML
INJECTION, SOLUTION INTRAVENOUS CONTINUOUS
Status: ACTIVE | OUTPATIENT
Start: 2021-07-26 | End: 2021-07-26

## 2021-07-26 RX ORDER — MORPHINE SULFATE 2 MG/ML
2 INJECTION, SOLUTION INTRAMUSCULAR; INTRAVENOUS EVERY 2 HOUR PRN
Status: DISCONTINUED | OUTPATIENT
Start: 2021-07-26 | End: 2021-08-01

## 2021-07-26 RX ORDER — MORPHINE SULFATE 2 MG/ML
1 INJECTION, SOLUTION INTRAMUSCULAR; INTRAVENOUS EVERY 2 HOUR PRN
Status: DISCONTINUED | OUTPATIENT
Start: 2021-07-26 | End: 2021-08-01

## 2021-07-26 RX ORDER — MORPHINE SULFATE 4 MG/ML
4 INJECTION, SOLUTION INTRAMUSCULAR; INTRAVENOUS EVERY 2 HOUR PRN
Status: DISCONTINUED | OUTPATIENT
Start: 2021-07-26 | End: 2021-08-01

## 2021-07-26 RX ORDER — ONDANSETRON 2 MG/ML
4 INJECTION INTRAMUSCULAR; INTRAVENOUS EVERY 6 HOURS PRN
Status: DISCONTINUED | OUTPATIENT
Start: 2021-07-26 | End: 2021-08-01

## 2021-07-26 RX ORDER — ALLOPURINOL 100 MG/1
200 TABLET ORAL DAILY
COMMUNITY
End: 2022-01-19

## 2021-07-26 NOTE — H&P
[facility]  History and Physical     [facility] Patient Status:  Emergency    1975 MRN [id number]   Location [address] [facility] [address] Attending Tawanda Silva MD   Hosp Day # 0 PCP Rodri Dougherty DO     Chief Compl Allergies  Medications:  No current facility-administered medications on file prior to encounter. Naproxen Sodium (ALEVE OR), Take by mouth., Disp: , Rfl:   finasteride 1 MG Oral Tab, Take 1 mg by mouth daily. , Disp: , Rfl:   allopurinol 100 MG Oral Tab, BILT 0.7   TP 7.3     No results for input(s): PTP, INR in the last 168 hours. Recent Labs   Lab 07/26/21  1213   ,091*     Imaging: Imaging data reviewed in Epic. ASSESSMENT / PLAN:   1.  Strenuous exercise with bilateral leg pain and swelling- p

## 2021-07-26 NOTE — ED INITIAL ASSESSMENT (HPI)
To the ED with vera wallis. States had rucking event on Saturday- states is a physical event. States soreness but this AM with red tinged urine, swelling to both legs and unable to move knees.

## 2021-07-26 NOTE — ED PROVIDER NOTES
Patient Seen in: [facility] Emergency Department      History   Patient presents with:  Leg or Foot Injury    Stated Complaint: LEG PAIN     HPI/Subjective:   HPI  43-year-old male with no significant medical history presents to the emergency room c Physical Exam  Vitals and nursing note reviewed. Constitutional:       General: He is not in acute distress. Appearance: Normal appearance. He is well-developed. He is not ill-appearing or toxic-appearing.    Cardiovascular:      Rate and Rhyt -----------         ------                     CBC W/ DIFFERENTIAL[[id number]]          Abnormal            Final result                 Please view results for these tests on the individual orders.    RAINBOW DRAW LAVENDER   RAINBOW DRAW

## 2021-07-26 NOTE — TELEPHONE ENCOUNTER
Spoke to patient. States his soreness is beyond anything he has experienced. States can barely walk and urine is brown. Advised to proceed to ER for eval an treatment.

## 2021-07-26 NOTE — ED QUICK NOTES
Orders for admission, patient is aware of plan and ready to go upstairs. Any questions, please call ED KORTNEY Baker  at extension [phone]. Vaccinated?  YES  Type of COVID test sent:n/a  COVID Suspicion level: Low      Titratable drug(s) infusing:n/a  Rate:

## 2021-07-26 NOTE — TELEPHONE ENCOUNTER
Monse Yesyesther is calling because he did a marathon over the weekend, with carrying sand bags, exercise activities, and other obstacle activities, he did this on Sat, Yesterday when he woke up he had leg pain to the point where he cannot move his legs and his urin

## 2021-07-27 LAB
ALBUMIN SERPL-MCNC: 2.8 G/DL (ref 3.4–5)
ALBUMIN/GLOB SERPL: 0.9 {RATIO} (ref 1–2)
ALP LIVER SERPL-CCNC: 41 U/L
ALT SERPL-CCNC: 400 U/L
ANION GAP SERPL CALC-SCNC: 5 MMOL/L (ref 0–18)
AST SERPL-CCNC: 2190 U/L (ref 15–37)
BILIRUB SERPL-MCNC: 0.4 MG/DL (ref 0.1–2)
BUN BLD-MCNC: 16 MG/DL (ref 7–18)
BUN/CREAT SERPL: 13.6 (ref 10–20)
CALCIUM BLD-MCNC: 7.4 MG/DL (ref 8.5–10.1)
CHLORIDE SERPL-SCNC: 113 MMOL/L (ref 98–112)
CK SERPL-CCNC: ABNORMAL U/L
CO2 SERPL-SCNC: 24 MMOL/L (ref 21–32)
CREAT BLD-MCNC: 1.18 MG/DL
DEPRECATED RDW RBC AUTO: 42.1 FL (ref 35.1–46.3)
ERYTHROCYTE [DISTWIDTH] IN BLOOD BY AUTOMATED COUNT: 13 % (ref 11–15)
GLOBULIN PLAS-MCNC: 3.1 G/DL (ref 2.8–4.4)
GLUCOSE BLD-MCNC: 99 MG/DL (ref 70–99)
HCT VFR BLD AUTO: 32.9 %
HGB BLD-MCNC: 11.3 G/DL
M PROTEIN MFR SERPL ELPH: 5.9 G/DL (ref 6.4–8.2)
MCH RBC QN AUTO: 30.5 PG (ref 26–34)
MCHC RBC AUTO-ENTMCNC: 34.3 G/DL (ref 31–37)
MCV RBC AUTO: 88.7 FL
OSMOLALITY SERPL CALC.SUM OF ELEC: 295 MOSM/KG (ref 275–295)
PLATELET # BLD AUTO: 172 10(3)UL (ref 150–450)
POTASSIUM SERPL-SCNC: 4.7 MMOL/L (ref 3.5–5.1)
RBC # BLD AUTO: 3.71 X10(6)UL
SODIUM SERPL-SCNC: 142 MMOL/L (ref 136–145)
WBC # BLD AUTO: 6.7 X10(3) UL (ref 4–11)

## 2021-07-27 PROCEDURE — 99232 SBSQ HOSP IP/OBS MODERATE 35: CPT | Performed by: HOSPITALIST

## 2021-07-27 PROCEDURE — 99232 SBSQ HOSP IP/OBS MODERATE 35: CPT | Performed by: ORTHOPAEDIC SURGERY

## 2021-07-27 RX ORDER — POLYETHYLENE GLYCOL 3350 17 G/17G
17 POWDER, FOR SOLUTION ORAL DAILY PRN
Status: DISCONTINUED | OUTPATIENT
Start: 2021-07-27 | End: 2021-08-01

## 2021-07-27 RX ORDER — DOCUSATE SODIUM 100 MG/1
100 CAPSULE, LIQUID FILLED ORAL 2 TIMES DAILY
Status: DISCONTINUED | OUTPATIENT
Start: 2021-07-27 | End: 2021-08-01

## 2021-07-27 RX ORDER — ACETAMINOPHEN 325 MG/1
650 TABLET ORAL EVERY 6 HOURS PRN
Status: DISCONTINUED | OUTPATIENT
Start: 2021-07-27 | End: 2021-08-01

## 2021-07-27 NOTE — PROGRESS NOTES
[facility]  Progress Note     Matt Ro Patient Status:  Inpatient    1975 MRN [id number]   [facility] 3SW-A Attending Rosalie Jimenez MD   Hosp Day # 2 PCP Caroline Kelly DO     Chief Complaint: Rhabdomyolysis     S COVID-19 Lab Results    COVID-19  Lab Results   Component Value Date    COVID19 NOT DETECTED 10/20/2020       Pro-Calcitonin  No results for input(s): PCT in the last 168 hours. Cardiac  No results for input(s): TROP, PBNP in the last 168 hours.

## 2021-07-27 NOTE — PLAN OF CARE
Patient has less pain to thighs than yesterday, remain swollen with right thigh larger than left thigh. Pedal pulses + bilaterally and legs warm to touch. CK down to 96,716 from yesterday which was 166,091. IV infusing at 200 ml per hour.  Patient's appeti

## 2021-07-27 NOTE — PLAN OF CARE
Patient resting in bed ,has only mild pain of not moving ,pain is in bilateral thighs ,right thigh is more swollen and firm to touch ,denies any numbness or tingling ,neuro vascular check done q 2 hrs as ordered ,palpable pedal pulses ,voiding in the urina

## 2021-07-27 NOTE — PROGRESS NOTES
[facility] PROGRESS NOTE  Subjective: Patient is comfortable at rest with mild bilateral thigh pain with movement. No CP/SOB/numbness/tinging.   No pain medication over night, rated at 1/10 this AM.     Objective:    Temp Readings from Last 3 Encounter

## 2021-07-27 NOTE — CONSULTS
[facility] Consult Note    CC: Bilateral thigh pain and swelling    HPI: This 41year old male presented to the ER earlier today with complaints of bilateral thigh pain and swelling.   He had a very vigorous workout this past Saturday on 7/24/2021 where he w Not on file    Tobacco Use      Smoking status: Never Smoker      Smokeless tobacco: Never Used    Vaping Use      Vaping Use: Never used    Substance and Sexual Activity      Alcohol use:  Yes        Alcohol/week: 5.0 standard drinks        Types: 6 Cans o Result Value Ref Range    Hold Lt Green Auto Resulted    Comp Metabolic Panel (14)    Collection Time: 07/26/21 12:13 PM   Result Value Ref Range    Glucose 102 (H) 70 - 99 mg/dL    Sodium 133 (L) 136 - 145 mmol/L    Potassium 4.5 3.5 - 5.1 mmol/L    Chl RBC 4.19 (L) 4.30 - 5.70 x10(6)uL    HGB 12.7 (L) 13.0 - 17.5 g/dL    HCT 36.0 (L) 39.0 - 53.0 %    .0 150.0 - 450.0 10(3)uL    MCV 85.9 80.0 - 100.0 fL    MCH 30.3 26.0 - 34.0 pg    MCHC 35.3 31.0 - 37.0 g/dL    RDW 12.6 11.0 - 15.0 %    RDW-SD 39. case his clinical picture worsens. Supportive care for his rhabdomyolysis including DVT mechanical prophylaxis below the knees, IV hydration, limited pain medication and close monitoring is advised along with frequent neurovascular checks.   All questions

## 2021-07-27 NOTE — PLAN OF CARE
NURSING ADMISSION NOTE      Patient admitted via Cart from ER. Oriented to room. Safety precautions initiated. Bed in low position. Call light in reach. Patient rating pain 9 out of 10 to thighs with right thigh being worse.  Pedal pulses present,

## 2021-07-27 NOTE — PROGRESS NOTES
[facility]  Progress Note     García Baum Patient Status:  Inpatient    1975 MRN [id number]   [facility] 3SW-A Attending Troy Barron MD   Hosp Day # 1 PCP Margy Francois DO     Chief Complaint: Bere Damico   S:  Pain Inflammatory Markers  No results for input(s): CRP, JUSTIN, LDH, DDIMER in the last 168 hours. Imaging: Imaging data reviewed in Epic. Medications:   ASSESSMENT / PLAN:   1.  Strenuous exercise with bilateral leg pain and swelling- profound rhabdomyol

## 2021-07-28 LAB
ALBUMIN SERPL-MCNC: 2.6 G/DL (ref 3.4–5)
ALBUMIN/GLOB SERPL: 0.8 {RATIO} (ref 1–2)
ALP LIVER SERPL-CCNC: 41 U/L
ALT SERPL-CCNC: 414 U/L
ANION GAP SERPL CALC-SCNC: 4 MMOL/L (ref 0–18)
AST SERPL-CCNC: 2017 U/L (ref 15–37)
BASOPHILS # BLD AUTO: 0.01 X10(3) UL (ref 0–0.2)
BASOPHILS NFR BLD AUTO: 0.2 %
BILIRUB SERPL-MCNC: 0.5 MG/DL (ref 0.1–2)
BUN BLD-MCNC: 12 MG/DL (ref 7–18)
BUN/CREAT SERPL: 11.3 (ref 10–20)
CALCIUM BLD-MCNC: 7.3 MG/DL (ref 8.5–10.1)
CHLORIDE SERPL-SCNC: 114 MMOL/L (ref 98–112)
CK SERPL-CCNC: ABNORMAL U/L
CK SERPL-CCNC: ABNORMAL U/L
CO2 SERPL-SCNC: 24 MMOL/L (ref 21–32)
CREAT BLD-MCNC: 1.06 MG/DL
DEPRECATED RDW RBC AUTO: 41.7 FL (ref 35.1–46.3)
EOSINOPHIL # BLD AUTO: 0.13 X10(3) UL (ref 0–0.7)
EOSINOPHIL NFR BLD AUTO: 2.4 %
ERYTHROCYTE [DISTWIDTH] IN BLOOD BY AUTOMATED COUNT: 12.8 % (ref 11–15)
GLOBULIN PLAS-MCNC: 3.1 G/DL (ref 2.8–4.4)
GLUCOSE BLD-MCNC: 101 MG/DL (ref 70–99)
HCT VFR BLD AUTO: 31.2 %
HGB BLD-MCNC: 10.7 G/DL
IMM GRANULOCYTES # BLD AUTO: 0.01 X10(3) UL (ref 0–1)
IMM GRANULOCYTES NFR BLD: 0.2 %
LYMPHOCYTES # BLD AUTO: 1.27 X10(3) UL (ref 1–4)
LYMPHOCYTES NFR BLD AUTO: 23.5 %
M PROTEIN MFR SERPL ELPH: 5.7 G/DL (ref 6.4–8.2)
MCH RBC QN AUTO: 30.5 PG (ref 26–34)
MCHC RBC AUTO-ENTMCNC: 34.3 G/DL (ref 31–37)
MCV RBC AUTO: 88.9 FL
MONOCYTES # BLD AUTO: 0.48 X10(3) UL (ref 0.1–1)
MONOCYTES NFR BLD AUTO: 8.9 %
NEUTROPHILS # BLD AUTO: 3.51 X10 (3) UL (ref 1.5–7.7)
NEUTROPHILS # BLD AUTO: 3.51 X10(3) UL (ref 1.5–7.7)
NEUTROPHILS NFR BLD AUTO: 64.8 %
OSMOLALITY SERPL CALC.SUM OF ELEC: 294 MOSM/KG (ref 275–295)
PLATELET # BLD AUTO: 162 10(3)UL (ref 150–450)
POTASSIUM SERPL-SCNC: 4 MMOL/L (ref 3.5–5.1)
RBC # BLD AUTO: 3.51 X10(6)UL
SODIUM SERPL-SCNC: 142 MMOL/L (ref 136–145)
WBC # BLD AUTO: 5.4 X10(3) UL (ref 4–11)

## 2021-07-28 PROCEDURE — 99233 SBSQ HOSP IP/OBS HIGH 50: CPT | Performed by: HOSPITALIST

## 2021-07-28 RX ORDER — TRAMADOL HYDROCHLORIDE 50 MG/1
50 TABLET ORAL EVERY 6 HOURS PRN
Status: DISCONTINUED | OUTPATIENT
Start: 2021-07-28 | End: 2021-08-01

## 2021-07-28 RX ORDER — ENOXAPARIN SODIUM 100 MG/ML
40 INJECTION SUBCUTANEOUS DAILY
Status: DISCONTINUED | OUTPATIENT
Start: 2021-07-28 | End: 2021-07-28

## 2021-07-28 RX ORDER — FUROSEMIDE 10 MG/ML
40 INJECTION INTRAMUSCULAR; INTRAVENOUS ONCE
Status: COMPLETED | OUTPATIENT
Start: 2021-07-28 | End: 2021-07-28

## 2021-07-28 NOTE — OCCUPATIONAL THERAPY NOTE
OCCUPATIONAL THERAPY QUICK ORTHO EVALUATION - INPATIENT    Room Number: 386/386-A  Evaluation Date: 7/28/2021     Type of Evaluation: Initial & Quick Eval  Presenting Problem: non-traumatic rhabdo    Physician Order: IP Consult to Occupational Therapy  Brendan Warner is worst with lifting his legs and bending his knees.      PAIN ASSESSMENT  Rating: Unable to rate  Location: B thighs, L worse than R  Management Techniques: Relaxation;Repositioning    OBJECTIVE     Fall Risk: Standard fall risk    WEIGHT BEARING RESTRICT

## 2021-07-28 NOTE — CM/SW NOTE
--------------  ADMISSION REVIEW     Payor: Ketty Nath  #:  [id number]  Authorization Number: [id number]    Admit date: 7/26/21  Admit time:  4:42 PM       Admitting Physician: Terri Barajas MD  Attending Physician:  Annelise Stewart is normal.      Breath sounds: Normal breath sounds. Musculoskeletal:      Comments: Bilateral thigh swelling with compartments soft with tenderness to palpation. No calf swelling or tenderness.   Full range of motion of ankles with limited range of avi pain and 3 L of fluids.            Disposition and Plan     Clinical Impression:  Non-traumatic rhabdomyolysis  (primary encounter diagnosis)     Disposition:  Admit  7/26/2021  3:00 pm         Attestation signed by Martinez Gonsalez MD at 7/28/2021  8:07 A able to move them but extremely difficult. He has never had issues like this before. He denies any abdominal pain nausea vomiting or diarrhea. He denies any other joint pain in his arms and chest feels fine. He has noticed dark urine.   He denies skin c Soft, nontender, nondistended. Positive bowel sounds. No rebound or guarding. Neurologic: CNII-XII grossly intact. No focal neurological deficits.    Musculoskeletal: Moves all extremities but limited in lower extremities  Extremities: Bilateral extremity Bag (none) Intravenous Fouzia Boinlla RN    7/27/2021 1216 New Bag (none) Intravenous Leroy Dozier RN          REVIEWER COMMENTS:     OTHER:

## 2021-07-28 NOTE — PHYSICAL THERAPY NOTE
PHYSICAL THERAPY EVALUATION - INPATIENT     Room Number: 386/386-A  Evaluation Date: 7/28/2021  Type of Evaluation: Initial  Physician Order: PT Eval and Treat    Presenting Problem: non traumatic rhabdomyolysis, bilat thighs, LE  Reason for Therapy: extremity        R Lower Extremity: Weight Bearing as Tolerated  L Lower Extremity: Weight Bearing as Tolerated    PAIN ASSESSMENT  Rating: Other (Comment) (3 right LE, 5 left LE during amb)  Location: bilat thighs left knee  Management Techniques: Reposit (ft): 100  Assistive Device: Rolling walker  Pattern:  (steppage gait bilat, keeping knees in extension due to pain)     Comment : 4 stairs with railing and cane with cga    Skilled Therapy Provided: Pt recd in supine, educated in role of PT, goals for ses In this PT evaluation, the patient presents with the following impairments bilat thigh pain, bilat LE edema with limited ROM, dec strength bilat LE, dec balance and activity tolerance. These impairments and comorbidities manifest themselves as functional li

## 2021-07-28 NOTE — PROGRESS NOTES
[facility] PROGRESS NOTE     Subjective: Patient is comfortable \"feeling better and started physical therapy\". CP/SOB/numbness/tinging.      Objective:    Temp Readings from Last 3 Encounters:  07/28/21 : 98.8 °F (37.1 °C) (Oral)  07/13/21 : 97.4 °F

## 2021-07-28 NOTE — PLAN OF CARE
A/O VSS on room air. Bilateral thighs still swollen right > left. Pedal pulses palpable. Skin warm color good. Walked to the bathroom stated he felt stiff. IVF's as ordered. Tylenol given for headache. Plan of care reviewed. Call light within reach.

## 2021-07-29 LAB
ALBUMIN SERPL-MCNC: 2.7 G/DL (ref 3.4–5)
ALBUMIN/GLOB SERPL: 0.8 {RATIO} (ref 1–2)
ALP LIVER SERPL-CCNC: 40 U/L
ALT SERPL-CCNC: 424 U/L
ANION GAP SERPL CALC-SCNC: 3 MMOL/L (ref 0–18)
AST SERPL-CCNC: 2065 U/L (ref 15–37)
BASOPHILS # BLD AUTO: 0.01 X10(3) UL (ref 0–0.2)
BASOPHILS NFR BLD AUTO: 0.2 %
BILIRUB SERPL-MCNC: 0.7 MG/DL (ref 0.1–2)
BUN BLD-MCNC: 11 MG/DL (ref 7–18)
BUN/CREAT SERPL: 9.1 (ref 10–20)
CALCIUM BLD-MCNC: 7.7 MG/DL (ref 8.5–10.1)
CHLORIDE SERPL-SCNC: 111 MMOL/L (ref 98–112)
CK SERPL-CCNC: ABNORMAL U/L
CK SERPL-CCNC: ABNORMAL U/L
CO2 SERPL-SCNC: 26 MMOL/L (ref 21–32)
CREAT BLD-MCNC: 1.21 MG/DL
DEPRECATED RDW RBC AUTO: 39.3 FL (ref 35.1–46.3)
EOSINOPHIL # BLD AUTO: 0.2 X10(3) UL (ref 0–0.7)
EOSINOPHIL NFR BLD AUTO: 3.4 %
ERYTHROCYTE [DISTWIDTH] IN BLOOD BY AUTOMATED COUNT: 12.2 % (ref 11–15)
GLOBULIN PLAS-MCNC: 3.2 G/DL (ref 2.8–4.4)
GLUCOSE BLD-MCNC: 90 MG/DL (ref 70–99)
HCT VFR BLD AUTO: 31.9 %
HGB BLD-MCNC: 10.7 G/DL
IMM GRANULOCYTES # BLD AUTO: 0.02 X10(3) UL (ref 0–1)
IMM GRANULOCYTES NFR BLD: 0.3 %
LYMPHOCYTES # BLD AUTO: 1.51 X10(3) UL (ref 1–4)
LYMPHOCYTES NFR BLD AUTO: 25.4 %
M PROTEIN MFR SERPL ELPH: 5.9 G/DL (ref 6.4–8.2)
MCH RBC QN AUTO: 29.7 PG (ref 26–34)
MCHC RBC AUTO-ENTMCNC: 33.5 G/DL (ref 31–37)
MCV RBC AUTO: 88.6 FL
MONOCYTES # BLD AUTO: 0.54 X10(3) UL (ref 0.1–1)
MONOCYTES NFR BLD AUTO: 9.1 %
NEUTROPHILS # BLD AUTO: 3.67 X10 (3) UL (ref 1.5–7.7)
NEUTROPHILS # BLD AUTO: 3.67 X10(3) UL (ref 1.5–7.7)
NEUTROPHILS NFR BLD AUTO: 61.6 %
OSMOLALITY SERPL CALC.SUM OF ELEC: 289 MOSM/KG (ref 275–295)
PLATELET # BLD AUTO: 182 10(3)UL (ref 150–450)
POTASSIUM SERPL-SCNC: 3.8 MMOL/L (ref 3.5–5.1)
RBC # BLD AUTO: 3.6 X10(6)UL
SODIUM SERPL-SCNC: 140 MMOL/L (ref 136–145)
WBC # BLD AUTO: 6 X10(3) UL (ref 4–11)

## 2021-07-29 PROCEDURE — 99232 SBSQ HOSP IP/OBS MODERATE 35: CPT | Performed by: HOSPITALIST

## 2021-07-29 RX ORDER — ALLOPURINOL 100 MG/1
200 TABLET ORAL DAILY
Status: DISCONTINUED | OUTPATIENT
Start: 2021-07-29 | End: 2021-08-01

## 2021-07-29 RX ORDER — FUROSEMIDE 10 MG/ML
20 INJECTION INTRAMUSCULAR; INTRAVENOUS ONCE
Status: COMPLETED | OUTPATIENT
Start: 2021-07-29 | End: 2021-07-29

## 2021-07-29 NOTE — PLAN OF CARE
Pt ambulated frequently in room. Walked with therapy this am and practiced stairs. Moving and bending knees better today. CK slightly increased this am.  Lasix IV given as ordered. IVF's continued at 200/hr.   Awaiting repeat CK level result from aftern

## 2021-07-29 NOTE — PLAN OF CARE
Alert and oriented x4. Ambulating to bathroom to void with walker. Pt states urine color is lightening. BLE stiff, mild edema. Mild pain to left ankle, declined tylenol for pain, allopurinol given. Multiple bms yesterday, declined colace. IV lasix given.  Chloe Gorman

## 2021-07-29 NOTE — CM/SW NOTE
For discharge planning needs call [id number] [facility] with Gonzalo Bells [phone] [id number].

## 2021-07-29 NOTE — PROGRESS NOTES
[facility]  Progress Note     Chloe Floyd Patient Status:  Inpatient    1975 MRN [id number]   [facility] 3SW-A Attending Ancelmo Moeller MD   Hosp Day # 3 PCP Luigi Felix DO     Chief Complaint: Rhabdomyolysis     S hours.         COVID-19 Lab Results    COVID-19  Lab Results   Component Value Date    COVID19 NOT DETECTED 10/20/2020       Pro-Calcitonin  No results for input(s): PCT in the last 168 hours.     Cardiac  No results for input(s): TROP, PBNP in the last 168

## 2021-07-29 NOTE — PHYSICAL THERAPY NOTE
Attempted to see Pt this AM - KORTNEY Pedraza aware of attempt. Pt completed stairs and ambulation with Evaluating PT - Pt denied further questions or concerns - Pt up at [facility] AD. Will sign off at this time.

## 2021-07-29 NOTE — PLAN OF CARE
VSS on room air. Patient states he feels a little better. Walked with PT. Still with some stiffness. Denies any need for pain meds. IVF as ordered. Plan of care reviewed. Call light within reach.

## 2021-07-30 LAB
ALBUMIN SERPL-MCNC: 2.7 G/DL (ref 3.4–5)
ALBUMIN/GLOB SERPL: 0.8 {RATIO} (ref 1–2)
ALP LIVER SERPL-CCNC: 41 U/L
ALT SERPL-CCNC: 411 U/L
ANION GAP SERPL CALC-SCNC: 3 MMOL/L (ref 0–18)
AST SERPL-CCNC: 1534 U/L (ref 15–37)
BILIRUB SERPL-MCNC: 0.6 MG/DL (ref 0.1–2)
BUN BLD-MCNC: 9 MG/DL (ref 7–18)
CALCIUM BLD-MCNC: 7.9 MG/DL (ref 8.5–10.1)
CHLORIDE SERPL-SCNC: 107 MMOL/L (ref 98–112)
CK SERPL-CCNC: ABNORMAL U/L
CO2 SERPL-SCNC: 28 MMOL/L (ref 21–32)
CREAT BLD-MCNC: 1.05 MG/DL
GLOBULIN PLAS-MCNC: 3.3 G/DL (ref 2.8–4.4)
GLUCOSE BLD-MCNC: 93 MG/DL (ref 70–99)
M PROTEIN MFR SERPL ELPH: 6 G/DL (ref 6.4–8.2)
OSMOLALITY SERPL CALC.SUM OF ELEC: 284 MOSM/KG (ref 275–295)
POTASSIUM SERPL-SCNC: 3.3 MMOL/L (ref 3.5–5.1)
SODIUM SERPL-SCNC: 138 MMOL/L (ref 136–145)

## 2021-07-30 PROCEDURE — 99231 SBSQ HOSP IP/OBS SF/LOW 25: CPT | Performed by: HOSPITALIST

## 2021-07-30 PROCEDURE — 99222 1ST HOSP IP/OBS MODERATE 55: CPT | Performed by: INTERNAL MEDICINE

## 2021-07-30 RX ORDER — POTASSIUM CHLORIDE 20 MEQ/1
40 TABLET, EXTENDED RELEASE ORAL ONCE
Status: COMPLETED | OUTPATIENT
Start: 2021-07-30 | End: 2021-07-30

## 2021-07-30 NOTE — PLAN OF CARE
Nonpitting edema to bilateral lower extremities. Ivf infusing. States minimal pain. Instructed on plan of care, call don't fall protocol, call for all asst  needed, discomfort felt. Verbalized understanding.

## 2021-07-30 NOTE — PAYOR COMM NOTE
--------------  ADMISSION REVIEW     Payor: [facility] #:  [id number]  Authorization Number: [id number]    Admit date: 7/26/21  Admit time:  4:42 PM       Admitting Physician: Erik Weiner MD  Attending Physician:  Luna Camara, standard drinks      Types: 6 Cans of beer per week      Comment: weekends    Drug use: No             Review of Systems   All other systems reviewed and are negative. Positive for stated complaint: LEG PAIN   Other systems are as noted in HPI.   Const components:    ,091 (*)     All other components within normal limits   URINALYSIS WITH CULTURE REFLEX - Abnormal; Notable for the following components:    Blood Urine Large (*)     Protein Urine 30  (*)     Bacteria Urine 1+ (*)     All other compon Neurovascular intact distally. Case was discussed with Dr. Loy Kinsey. Asked for bilateral pressure measurements. Discussed options with patient who consented to the procedure. Sterile technique was followed. Pressure device used.   1% lidocaine was used to Temp 97.7 °F (36.5 °C) (Temporal)   Resp 18   Ht 5' 10\" (1.778 m)   Wt 185 lb (83.9 kg)   SpO2 100%   BMI 26.54 kg/m²   General: No acute distress. Alert and oriented x 3. HEENT: Normocephalic, atraumatic. EOM-I. Anicteric. Neck: No lymphadenopathy.  No mg     Date Action Dose Route User    7/30/2021 4634 Given 650 mg Oral Scott Brady RN      allopurinol (ZYLOPRIM) tab 200 mg     Date Action Dose Route User    7/30/2021 8493 Given 200 mg Oral Scott Brady RN    7/29/2021 1019 Given 200 mg Oral On questioning on his hospital bed the patient denies any associated numbness, tingling or distal radiation. Pain is rated at 6-7 out of 10. All of his symptoms are anterior and lateral more so on the right thigh than the left.   He states that the pain i thigh rhabdomyolysis, stable  · Exam stable to improved with no signs of thigh worsening or compartment syndrome on IVFs, Pain 1/10 at rest.  · SCDs for DVT prophylaxis  · PT/OT: gait train with walker assist, gentle ROM  · Continue to monitor closely    3. Elevated LFTs suspect 2/2 above- improving with hydration- cont to monitor   4. Anemia 2/2 hematuria- monitor     7/28 HOSPITALIST NOTE  Chief Complaint: Rhabdomyolysis      S: Patient admits to pain, but legs feel better than yesterday.  No chest pain of bilateral legs is improved with better active knee range of motion 0 to 60 degrees on the right 0 to 80 degrees on the left. Compartments are soft and only mildly tender to palpation anterior laterally.   Distal exam is benign with intact sensation, mot

## 2021-07-30 NOTE — PLAN OF CARE
Patient A&O X4 on RA. VSS, . SCDs. Voiding freely- urine clear yellow, LBM 7/28. IVF per orders. C/o minimal pain to left ankle- declines pain meds. C/o stiffness to BLE but improving since admission. Neurovascular checks Q4h. Ambulating with walker.  Re

## 2021-07-30 NOTE — CONSULTS
[facility]  Report of Consultation    Beata Esquivel Patient Status:  Inpatient    1975 MRN [id number]   [facility] 3SW-A Attending Charity Rubin MD   [facility] Day # 4 PCP Kim Paz DO     Reason for Consultation:  Rhabom Oral, Once  •  allopurinol (ZYLOPRIM) tab 200 mg, 200 mg, Oral, Daily  •  sodium bicarbonate 75 mEq in sodium chloride 0.45 % 1,000 mL infusion, 75 mEq, Intravenous, Continuous  •  traMADol (ULTRAM) tab 50 mg, 50 mg, Oral, Q6H PRN  •  docusate sodium (COLA 07/30/2021    GLU 93 07/30/2021    CA 7.9 07/30/2021    ALB 2.7 07/30/2021    ALKPHO 41 07/30/2021    BILT 0.6 07/30/2021    TP 6.0 07/30/2021    AST 1,534 07/30/2021     07/30/2021    CK 52,955 07/30/2021          BUN (mg/dL)   Date Value   07/30/2

## 2021-07-30 NOTE — PROGRESS NOTES
[facility]  Progress Note     Ancelmo Yanez Patient Status:  Inpatient    1975 MRN [id number]   [facility] 3SW-A Attending Tong Alfred MD   [facility] Day # 4 PCP Tierra Barnes DO     Chief Complaint: Rhabdomyolysis     S in the last 168 hours. COVID-19 Lab Results    COVID-19  Lab Results   Component Value Date    COVID19 NOT DETECTED 10/20/2020       Pro-Calcitonin  No results for input(s): PCT in the last 168 hours.     Cardiac  No results for input(s): TROP, PBNP

## 2021-07-31 LAB
ALBUMIN SERPL-MCNC: 2.5 G/DL (ref 3.4–5)
ALBUMIN/GLOB SERPL: 0.8 {RATIO} (ref 1–2)
ALP LIVER SERPL-CCNC: 39 U/L
ALT SERPL-CCNC: 355 U/L
ANION GAP SERPL CALC-SCNC: 2 MMOL/L (ref 0–18)
AST SERPL-CCNC: 1058 U/L (ref 15–37)
BILIRUB SERPL-MCNC: 0.5 MG/DL (ref 0.1–2)
BUN BLD-MCNC: 10 MG/DL (ref 7–18)
CALCIUM BLD-MCNC: 7.9 MG/DL (ref 8.5–10.1)
CHLORIDE SERPL-SCNC: 104 MMOL/L (ref 98–112)
CK SERPL-CCNC: ABNORMAL U/L
CK SERPL-CCNC: ABNORMAL U/L
CO2 SERPL-SCNC: 33 MMOL/L (ref 21–32)
CREAT BLD-MCNC: 1.03 MG/DL
GLOBULIN PLAS-MCNC: 3.3 G/DL (ref 2.8–4.4)
GLUCOSE BLD-MCNC: 89 MG/DL (ref 70–99)
M PROTEIN MFR SERPL ELPH: 5.8 G/DL (ref 6.4–8.2)
OSMOLALITY SERPL CALC.SUM OF ELEC: 287 MOSM/KG (ref 275–295)
POTASSIUM SERPL-SCNC: 3.6 MMOL/L (ref 3.5–5.1)
SODIUM SERPL-SCNC: 139 MMOL/L (ref 136–145)

## 2021-07-31 PROCEDURE — 99232 SBSQ HOSP IP/OBS MODERATE 35: CPT | Performed by: INTERNAL MEDICINE

## 2021-07-31 RX ORDER — FUROSEMIDE 10 MG/ML
20 INJECTION INTRAMUSCULAR; INTRAVENOUS ONCE
Status: COMPLETED | OUTPATIENT
Start: 2021-07-31 | End: 2021-07-31

## 2021-07-31 RX ORDER — POTASSIUM CHLORIDE 20 MEQ/1
40 TABLET, EXTENDED RELEASE ORAL ONCE
Status: COMPLETED | OUTPATIENT
Start: 2021-07-31 | End: 2021-07-31

## 2021-07-31 NOTE — PROGRESS NOTES
[facility]  Progress Note     Christ Riddle Patient Status:  Inpatient    1975 MRN [id number]   [facility] 3SW-A Attending Audrey Best MD   [facility] Day # 6 PCP Vandana Ramirez DO     Chief Complaint: Rhabdomyolysis     S Pro-Calcitonin  No results for input(s): PCT in the last 168 hours. Cardiac  No results for input(s): TROP, PBNP in the last 168 hours.     Creatinine Kinase  Recent Labs   Lab 07/30/21  0527 07/31/21  0557 07/31/21  1535   CK 52,955* 45,979* 27,66

## 2021-07-31 NOTE — PLAN OF CARE
Patient alert and oriented x4. Vital signs stable. No complaints of pain at this time, denies need for pain medication. IVF infusing per orders. Patient sitting up in recliner chair. Nonpitting edema to BLE, reported as improving.      Patient reminded to u

## 2021-07-31 NOTE — PROGRESS NOTES
[facility]  Progress Note    Yulissa Bah Patient Status:  Inpatient    1975 MRN [id number]   [facility] 3SW-A Attending Madeline Blanco MD   [facility] Day # 5 PCP Berenice Pallas, DO     No acute events overnight  Good UOP  Pain 88.6   .0       Recent Labs     07/29/21  0501 07/30/21  0527 07/31/21  0557    138 139   K 3.8 3.3* 3.6    107 104   CO2 26.0 28.0 33.0*   BUN 11 9 10   CREATSERUM 1.21 1.05 1.03   CA 7.7* 7.9* 7.9*       Recent Labs     07/29/21  0501

## 2021-08-01 ENCOUNTER — TELEPHONE (OUTPATIENT)
Dept: FAMILY MEDICINE CLINIC | Facility: CLINIC | Age: 46
End: 2021-08-01

## 2021-08-01 VITALS
SYSTOLIC BLOOD PRESSURE: 118 MMHG | BODY MASS INDEX: 26.48 KG/M2 | DIASTOLIC BLOOD PRESSURE: 80 MMHG | TEMPERATURE: 98 F | OXYGEN SATURATION: 97 % | HEIGHT: 70 IN | WEIGHT: 185 LBS | RESPIRATION RATE: 18 BRPM | HEART RATE: 78 BPM

## 2021-08-01 DIAGNOSIS — M62.82 NON-TRAUMATIC RHABDOMYOLYSIS: Primary | ICD-10-CM

## 2021-08-01 LAB
ALBUMIN SERPL-MCNC: 2.6 G/DL (ref 3.4–5)
ALP LIVER SERPL-CCNC: 39 U/L
ALT SERPL-CCNC: 308 U/L
ANION GAP SERPL CALC-SCNC: 6 MMOL/L (ref 0–18)
AST SERPL-CCNC: 709 U/L (ref 15–37)
BILIRUB DIRECT SERPL-MCNC: 0.2 MG/DL (ref 0–0.2)
BILIRUB SERPL-MCNC: 0.4 MG/DL (ref 0.1–2)
BUN BLD-MCNC: 14 MG/DL (ref 7–18)
CALCIUM BLD-MCNC: 8.1 MG/DL (ref 8.5–10.1)
CHLORIDE SERPL-SCNC: 105 MMOL/L (ref 98–112)
CK SERPL-CCNC: ABNORMAL U/L
CO2 SERPL-SCNC: 31 MMOL/L (ref 21–32)
CREAT BLD-MCNC: 1.08 MG/DL
GLUCOSE BLD-MCNC: 92 MG/DL (ref 70–99)
M PROTEIN MFR SERPL ELPH: 5.7 G/DL (ref 6.4–8.2)
OSMOLALITY SERPL CALC.SUM OF ELEC: 294 MOSM/KG (ref 275–295)
POTASSIUM SERPL-SCNC: 3.7 MMOL/L (ref 3.5–5.1)
SODIUM SERPL-SCNC: 142 MMOL/L (ref 136–145)

## 2021-08-01 PROCEDURE — 99239 HOSP IP/OBS DSCHRG MGMT >30: CPT | Performed by: HOSPITALIST

## 2021-08-01 PROCEDURE — 99232 SBSQ HOSP IP/OBS MODERATE 35: CPT | Performed by: INTERNAL MEDICINE

## 2021-08-01 NOTE — PROGRESS NOTES
Dr Finesse Butterfield here and informed pt that he may be dc'd and follow up with him next with with some labs. Pt anxious to go home. Page sent to Dr Shanel Jones. Dr Dayron Sloan on call. Awaiting response.

## 2021-08-01 NOTE — PLAN OF CARE
Pt A&O. On room air. Tolerating diet. Voiding w/o difficulty. Up independently. Pain managed with oral medications. IVFs as ordered. CK level continues to decrease. Pt ready for discharge home today.

## 2021-08-01 NOTE — PLAN OF CARE
Alert and oriented x4. Mild left ankle pain tolerable with ice pack. Ambulating in halls without walker. Ambulating in room frequently to bathroom. Voiding freely clear yellow urine. C/o decreased appetite, but ate all of lunch.  Received 1 dose of iv lasix

## 2021-08-01 NOTE — PROGRESS NOTES
[facility] [address]  [place], [address]  ?  08/01/21  ? Re: Evins Lefort  ? To Whom It May Concern:    Evins Lefort was admitted to [facility] from 7/26/2021 to 08/01/21.     Please excuse Evins Lefort from at

## 2021-08-01 NOTE — DISCHARGE SUMMARY
[address] Patient Status:  Inpatient    1975 MRN [id number]   [facility] 3SW-A Attending Khushi Monge MD   [facility] Day # 6 PCP Nessa Dickey DO     Date of Admission: 2021  Faheem 200 mg by mouth daily. Refills: 0     finasteride 1 MG Tabs  Commonly known as: PROPECIA      Take 1 mg by mouth daily. Refills: 0     Fish Oil 300 MG Caps      Take 1 capsule by mouth daily.    Refills: 0     MOVE FREE OR      Take 2 tablets by mouth o

## 2021-08-01 NOTE — PLAN OF CARE
Patient alert and oriented x4. Vital signs stable. Pain present to LLE, ice packs applied to ankle/foot, PO Tylenol given PRN. Pt ambulating in room without walker, tolerating well. Ambulated in halls during the day. Voiding freely in the bathroom.  Soft st

## 2021-08-01 NOTE — PROGRESS NOTES
[facility]  Progress Note    Rakel Hammond Patient Status:  Inpatient    1975 MRN [id number]   [facility] 3SW-A Attending Taras Reich MD   [facility] Day # 6 PCP Taylor Vivas, DO     No acute events overnight  Good UOP  Feel hours.    Invalid input(s): LYM#, MONO#, BASOS#, EOSIN#    Recent Labs     07/30/21  0527 07/31/21  0557 08/01/21  0536    139 142   K 3.3* 3.6 3.7    104 105   CO2 28.0 33.0* 31.0   BUN 9 10 14   CREATSERUM 1.05 1.03 1.08   CA 7.9* 7.9* 8.1*

## 2021-08-02 NOTE — PAYOR COMM NOTE
--------------  DISCHARGE REVIEW    Payor: Ketty Nath  #:  [id number]  Authorization Number: [id number]    Admit date: 7/26/21  Admit time:   4:42 PM  Discharge Date: 8/1/2021 12:00 PM     Admitting Physician: MD Kofi Reeves

## 2021-08-03 ENCOUNTER — PATIENT OUTREACH (OUTPATIENT)
Dept: CASE MANAGEMENT | Age: 46
End: 2021-08-03

## 2021-08-03 DIAGNOSIS — Z02.9 ENCOUNTERS FOR UNSPECIFIED ADMINISTRATIVE PURPOSE: ICD-10-CM

## 2021-08-03 NOTE — PROGRESS NOTES
LM for pt to call [facility] for TCM since discharge. [facility] phone number was provided for pt to call back.

## 2021-08-03 NOTE — PROGRESS NOTES
LM for pt to call [facility] for TCM since discharge. [facility] phone number was provided for pt to call back. Pt called back and LM to call him back.

## 2021-08-04 ENCOUNTER — LAB ENCOUNTER (OUTPATIENT)
Dept: LAB | Age: 46
End: 2021-08-04
Attending: INTERNAL MEDICINE
Payer: COMMERCIAL

## 2021-08-04 DIAGNOSIS — M62.82 NON-TRAUMATIC RHABDOMYOLYSIS: ICD-10-CM

## 2021-08-04 LAB
ANION GAP SERPL CALC-SCNC: 4 MMOL/L (ref 0–18)
BUN BLD-MCNC: 20 MG/DL (ref 7–18)
CALCIUM BLD-MCNC: 8.8 MG/DL (ref 8.5–10.1)
CHLORIDE SERPL-SCNC: 106 MMOL/L (ref 98–112)
CK SERPL-CCNC: 7331 U/L
CO2 SERPL-SCNC: 28 MMOL/L (ref 21–32)
CREAT BLD-MCNC: 1.21 MG/DL
GLUCOSE BLD-MCNC: 81 MG/DL (ref 70–99)
OSMOLALITY SERPL CALC.SUM OF ELEC: 288 MOSM/KG (ref 275–295)
PATIENT FASTING Y/N/NP: YES
POTASSIUM SERPL-SCNC: 3.9 MMOL/L (ref 3.5–5.1)
SODIUM SERPL-SCNC: 138 MMOL/L (ref 136–145)

## 2021-08-04 PROCEDURE — 36415 COLL VENOUS BLD VENIPUNCTURE: CPT

## 2021-08-04 PROCEDURE — 80048 BASIC METABOLIC PNL TOTAL CA: CPT

## 2021-08-04 PROCEDURE — 82550 ASSAY OF CK (CPK): CPT

## 2021-08-05 ENCOUNTER — TELEPHONE (OUTPATIENT)
Dept: NEPHROLOGY | Facility: CLINIC | Age: 46
End: 2021-08-05

## 2021-08-06 ENCOUNTER — TELEPHONE (OUTPATIENT)
Dept: FAMILY MEDICINE CLINIC | Facility: CLINIC | Age: 46
End: 2021-08-06

## 2021-08-06 DIAGNOSIS — M62.82 NON-TRAUMATIC RHABDOMYOLYSIS: Primary | ICD-10-CM

## 2021-08-06 NOTE — TELEPHONE ENCOUNTER
The following labs have been ordered for patient to complete through an [facility] location 1 to 2 days prior to his appointment with me, hepatic function panel and CK.

## 2021-08-06 NOTE — TELEPHONE ENCOUNTER
Patient sent a [employer] message requesting appointment for  F/u from recent hospitalization. Did make TCM appointment for 8/13/21. Book by date is 8/15/21. Patient is wondering if you would like to place orders for any labs before appointment.   Please adv

## 2021-08-10 ENCOUNTER — TELEPHONE (OUTPATIENT)
Dept: NEPHROLOGY | Facility: CLINIC | Age: 46
End: 2021-08-10

## 2021-08-10 DIAGNOSIS — M62.82 NON-TRAUMATIC RHABDOMYOLYSIS: Primary | ICD-10-CM

## 2021-08-10 NOTE — TELEPHONE ENCOUNTER
Ck improving  Will have pt come in this week for recheck again (tomorrow)    Discussed w/ wife.      KP

## 2021-08-11 ENCOUNTER — LAB ENCOUNTER (OUTPATIENT)
Dept: LAB | Age: 46
End: 2021-08-11
Attending: FAMILY MEDICINE
Payer: COMMERCIAL

## 2021-08-11 LAB
ALBUMIN SERPL-MCNC: 4 G/DL (ref 3.4–5)
ALP LIVER SERPL-CCNC: 55 U/L
ALT SERPL-CCNC: 125 U/L
AST SERPL-CCNC: 45 U/L (ref 15–37)
BILIRUB DIRECT SERPL-MCNC: 0.2 MG/DL (ref 0–0.2)
BILIRUB SERPL-MCNC: 0.5 MG/DL (ref 0.1–2)
CK SERPL-CCNC: 438 U/L
M PROTEIN MFR SERPL ELPH: 7.5 G/DL (ref 6.4–8.2)

## 2021-08-11 PROCEDURE — 80076 HEPATIC FUNCTION PANEL: CPT | Performed by: FAMILY MEDICINE

## 2021-08-11 PROCEDURE — 82550 ASSAY OF CK (CPK): CPT | Performed by: FAMILY MEDICINE

## 2021-08-11 PROCEDURE — 36415 COLL VENOUS BLD VENIPUNCTURE: CPT | Performed by: FAMILY MEDICINE

## 2021-08-13 ENCOUNTER — OFFICE VISIT (OUTPATIENT)
Dept: FAMILY MEDICINE CLINIC | Facility: CLINIC | Age: 46
End: 2021-08-13
Payer: COMMERCIAL

## 2021-08-13 VITALS
HEIGHT: 70 IN | HEART RATE: 85 BPM | BODY MASS INDEX: 26.63 KG/M2 | TEMPERATURE: 98 F | SYSTOLIC BLOOD PRESSURE: 120 MMHG | DIASTOLIC BLOOD PRESSURE: 80 MMHG | WEIGHT: 186 LBS | OXYGEN SATURATION: 98 %

## 2021-08-13 DIAGNOSIS — R31.0 GROSS HEMATURIA: ICD-10-CM

## 2021-08-13 DIAGNOSIS — M62.82 EXERTIONAL RHABDOMYOLYSIS: Primary | ICD-10-CM

## 2021-08-13 DIAGNOSIS — R74.01 TRANSAMINITIS: ICD-10-CM

## 2021-08-13 DIAGNOSIS — E87.1 HYPONATREMIA: ICD-10-CM

## 2021-08-13 DIAGNOSIS — M1A.09X0 IDIOPATHIC CHRONIC GOUT OF MULTIPLE SITES WITHOUT TOPHUS: ICD-10-CM

## 2021-08-13 PROCEDURE — 3008F BODY MASS INDEX DOCD: CPT | Performed by: FAMILY MEDICINE

## 2021-08-13 PROCEDURE — 99495 TRANSJ CARE MGMT MOD F2F 14D: CPT | Performed by: FAMILY MEDICINE

## 2021-08-13 PROCEDURE — 3079F DIAST BP 80-89 MM HG: CPT | Performed by: FAMILY MEDICINE

## 2021-08-13 PROCEDURE — 3074F SYST BP LT 130 MM HG: CPT | Performed by: FAMILY MEDICINE

## 2021-08-13 NOTE — PROGRESS NOTES
HPI:    Paola Flynn is a 41year old male here today for hospital follow up.    He was discharged from Inpatient hospital, [facility] to Home   Admission Date: 7/26/21   Discharge Date: 8/1/21  Hospital Discharge Diagnoses:    Discharge Yanna Paez Chronic pain of both ankles (8/8/2016), Hemorrhoids (2009), Irregular bowel habits (2020), Pain in both wrists (8/8/2016), Pain in joints (2015), and Wears glasses (2018). He  has no past surgical history on file.     He family history includes Heart Att orders for this visit:    Exertional rhabdomyolysis  -     CK CREATINE KINASE (NOT CREATININE); Future  -     COMP METABOLIC PANEL (14); Future  -     ORTHOPEDIC - INTERNAL    Transaminitis  -     COMP METABOLIC PANEL (14);  Future    Gross hematuria    Idi

## 2021-08-19 ENCOUNTER — OFFICE VISIT (OUTPATIENT)
Dept: ORTHOPEDICS CLINIC | Facility: CLINIC | Age: 46
End: 2021-08-19
Payer: COMMERCIAL

## 2021-08-19 VITALS — BODY MASS INDEX: 26.48 KG/M2 | WEIGHT: 185 LBS | HEIGHT: 70 IN

## 2021-08-19 DIAGNOSIS — M62.82 NON-TRAUMATIC RHABDOMYOLYSIS: Primary | ICD-10-CM

## 2021-08-19 PROCEDURE — 3008F BODY MASS INDEX DOCD: CPT | Performed by: ORTHOPAEDIC SURGERY

## 2021-08-19 PROCEDURE — 99213 OFFICE O/P EST LOW 20 MIN: CPT | Performed by: ORTHOPAEDIC SURGERY

## 2021-08-19 NOTE — PROGRESS NOTES
[facility] Follow-up Progress Note      Chief Complaint: Bilateral thigh rhabdomyolysis    History: Mr. Dominique Albarran is a 52-year-old male presenting for follow-up after being admitted for bilateral quadriceps overuse injuries and resultant rhabdo

## 2021-08-21 PROBLEM — M62.82 EXERTIONAL RHABDOMYOLYSIS: Status: ACTIVE | Noted: 2021-08-21

## 2021-09-07 NOTE — PROGRESS NOTES
Multiple attempts to reach the pt and messages left with a returned phone call however unable to reach the pt. Pt went to [facility]U with PCP on 8/13/21, closing encounter.

## 2021-09-14 ENCOUNTER — LAB ENCOUNTER (OUTPATIENT)
Dept: LAB | Age: 46
End: 2021-09-14
Attending: STUDENT IN AN ORGANIZED HEALTH CARE EDUCATION/TRAINING PROGRAM
Payer: COMMERCIAL

## 2021-09-14 DIAGNOSIS — Z01.818 PRE-OP TESTING: ICD-10-CM

## 2021-09-15 LAB — SARS-COV-2_1 BY PCR (AUGUSTUS LAB): NOT DETECTED

## 2021-09-17 PROBLEM — K63.5 COLON POLYP: Status: ACTIVE | Noted: 2021-09-17

## 2021-12-01 ENCOUNTER — LAB ENCOUNTER (OUTPATIENT)
Dept: LAB | Age: 46
End: 2021-12-01
Attending: FAMILY MEDICINE
Payer: COMMERCIAL

## 2021-12-01 DIAGNOSIS — Z12.5 SPECIAL SCREENING FOR MALIGNANT NEOPLASM OF PROSTATE: Primary | ICD-10-CM

## 2021-12-01 DIAGNOSIS — R74.01 TRANSAMINITIS: ICD-10-CM

## 2021-12-01 DIAGNOSIS — M62.82 EXERTIONAL RHABDOMYOLYSIS: ICD-10-CM

## 2021-12-01 PROCEDURE — 80053 COMPREHEN METABOLIC PANEL: CPT

## 2021-12-01 PROCEDURE — 84153 ASSAY OF PSA TOTAL: CPT

## 2021-12-01 PROCEDURE — 36415 COLL VENOUS BLD VENIPUNCTURE: CPT

## 2021-12-01 PROCEDURE — 82550 ASSAY OF CK (CPK): CPT

## 2022-01-19 ENCOUNTER — LAB ENCOUNTER (OUTPATIENT)
Dept: LAB | Age: 47
End: 2022-01-19
Attending: INTERNAL MEDICINE
Payer: COMMERCIAL

## 2022-01-19 DIAGNOSIS — M1A.09X0 IDIOPATHIC CHRONIC GOUT OF MULTIPLE SITES WITHOUT TOPHUS: ICD-10-CM

## 2022-01-19 LAB
BASOPHILS # BLD AUTO: 0.04 X10(3) UL (ref 0–0.2)
BASOPHILS NFR BLD AUTO: 0.7 %
EOSINOPHIL # BLD AUTO: 0.07 X10(3) UL (ref 0–0.7)
EOSINOPHIL NFR BLD AUTO: 1.1 %
ERYTHROCYTE [DISTWIDTH] IN BLOOD BY AUTOMATED COUNT: 12.7 %
HCT VFR BLD AUTO: 39.9 %
HGB BLD-MCNC: 13.1 G/DL
IMM GRANULOCYTES # BLD AUTO: 0.02 X10(3) UL (ref 0–1)
IMM GRANULOCYTES NFR BLD: 0.3 %
LYMPHOCYTES # BLD AUTO: 2.6 X10(3) UL (ref 1–4)
LYMPHOCYTES NFR BLD AUTO: 42.6 %
MCH RBC QN AUTO: 29.9 PG (ref 26–34)
MCHC RBC AUTO-ENTMCNC: 32.8 G/DL (ref 31–37)
MCV RBC AUTO: 91.1 FL
MONOCYTES # BLD AUTO: 0.58 X10(3) UL (ref 0.1–1)
MONOCYTES NFR BLD AUTO: 9.5 %
NEUTROPHILS # BLD AUTO: 2.79 X10 (3) UL (ref 1.5–7.7)
NEUTROPHILS # BLD AUTO: 2.79 X10(3) UL (ref 1.5–7.7)
NEUTROPHILS NFR BLD AUTO: 45.8 %
PLATELET # BLD AUTO: 258 10(3)UL (ref 150–450)
RBC # BLD AUTO: 4.38 X10(6)UL
URATE SERPL-MCNC: 8.7 MG/DL
WBC # BLD AUTO: 6.1 X10(3) UL (ref 4–11)

## 2022-01-19 PROCEDURE — 36415 COLL VENOUS BLD VENIPUNCTURE: CPT

## 2022-01-19 PROCEDURE — 85025 COMPLETE CBC W/AUTO DIFF WBC: CPT

## 2022-01-19 PROCEDURE — 84550 ASSAY OF BLOOD/URIC ACID: CPT

## 2022-07-21 ENCOUNTER — LAB ENCOUNTER (OUTPATIENT)
Dept: LAB | Age: 47
End: 2022-07-21
Attending: INTERNAL MEDICINE
Payer: COMMERCIAL

## 2022-07-21 DIAGNOSIS — M1A.09X0 IDIOPATHIC CHRONIC GOUT OF MULTIPLE SITES WITHOUT TOPHUS: ICD-10-CM

## 2022-07-21 LAB
ALBUMIN SERPL-MCNC: 4.2 G/DL (ref 3.4–5)
ALP LIVER SERPL-CCNC: 55 U/L
ALT SERPL-CCNC: 16 U/L
AST SERPL-CCNC: 15 U/L (ref 15–37)
BASOPHILS # BLD AUTO: 0.03 X10(3) UL (ref 0–0.2)
BASOPHILS NFR BLD AUTO: 0.6 %
BILIRUB DIRECT SERPL-MCNC: 0.2 MG/DL (ref 0–0.2)
BILIRUB SERPL-MCNC: 0.5 MG/DL (ref 0.1–2)
CREAT BLD-MCNC: 1.09 MG/DL
EOSINOPHIL # BLD AUTO: 0.16 X10(3) UL (ref 0–0.7)
EOSINOPHIL NFR BLD AUTO: 3.1 %
ERYTHROCYTE [DISTWIDTH] IN BLOOD BY AUTOMATED COUNT: 12.7 %
HCT VFR BLD AUTO: 39.9 %
HGB BLD-MCNC: 13.1 G/DL
IMM GRANULOCYTES # BLD AUTO: 0.01 X10(3) UL (ref 0–1)
IMM GRANULOCYTES NFR BLD: 0.2 %
LYMPHOCYTES # BLD AUTO: 1.43 X10(3) UL (ref 1–4)
LYMPHOCYTES NFR BLD AUTO: 27.7 %
MCH RBC QN AUTO: 29.8 PG (ref 26–34)
MCHC RBC AUTO-ENTMCNC: 32.8 G/DL (ref 31–37)
MCV RBC AUTO: 90.9 FL
MONOCYTES # BLD AUTO: 0.38 X10(3) UL (ref 0.1–1)
MONOCYTES NFR BLD AUTO: 7.4 %
NEUTROPHILS # BLD AUTO: 3.15 X10 (3) UL (ref 1.5–7.7)
NEUTROPHILS # BLD AUTO: 3.15 X10(3) UL (ref 1.5–7.7)
NEUTROPHILS NFR BLD AUTO: 61 %
PLATELET # BLD AUTO: 237 10(3)UL (ref 150–450)
PROT SERPL-MCNC: 7.9 G/DL (ref 6.4–8.2)
RBC # BLD AUTO: 4.39 X10(6)UL
URATE SERPL-MCNC: 5.6 MG/DL
WBC # BLD AUTO: 5.2 X10(3) UL (ref 4–11)

## 2022-07-21 PROCEDURE — 84550 ASSAY OF BLOOD/URIC ACID: CPT

## 2022-07-21 PROCEDURE — 85025 COMPLETE CBC W/AUTO DIFF WBC: CPT

## 2022-07-21 PROCEDURE — 80076 HEPATIC FUNCTION PANEL: CPT

## 2022-07-21 PROCEDURE — 82565 ASSAY OF CREATININE: CPT

## 2022-07-21 PROCEDURE — 36415 COLL VENOUS BLD VENIPUNCTURE: CPT

## 2023-08-21 ENCOUNTER — OFFICE VISIT (OUTPATIENT)
Dept: FAMILY MEDICINE CLINIC | Facility: CLINIC | Age: 48
End: 2023-08-21
Payer: COMMERCIAL

## 2023-08-21 VITALS
DIASTOLIC BLOOD PRESSURE: 78 MMHG | HEIGHT: 70 IN | BODY MASS INDEX: 27.49 KG/M2 | RESPIRATION RATE: 16 BRPM | HEART RATE: 86 BPM | SYSTOLIC BLOOD PRESSURE: 124 MMHG | WEIGHT: 192 LBS | OXYGEN SATURATION: 98 %

## 2023-08-21 DIAGNOSIS — Z00.00 ROUTINE GENERAL MEDICAL EXAMINATION AT HEALTH CARE FACILITY: Primary | ICD-10-CM

## 2023-08-21 DIAGNOSIS — Z12.5 SCREENING FOR PROSTATE CANCER: ICD-10-CM

## 2023-08-21 PROBLEM — R19.4 CHANGE IN BOWEL HABITS: Status: RESOLVED | Noted: 2021-05-08 | Resolved: 2023-08-21

## 2023-08-21 PROBLEM — M62.82 EXERTIONAL RHABDOMYOLYSIS: Status: RESOLVED | Noted: 2021-08-21 | Resolved: 2023-08-21

## 2023-08-21 PROBLEM — M79.18 RIGHT BUTTOCK PAIN: Status: RESOLVED | Noted: 2021-05-07 | Resolved: 2023-08-21

## 2023-08-21 PROBLEM — E87.1 HYPONATREMIA: Status: RESOLVED | Noted: 2021-07-26 | Resolved: 2023-08-21

## 2023-08-21 PROBLEM — E55.9 VITAMIN D DEFICIENCY: Status: RESOLVED | Noted: 2018-04-02 | Resolved: 2023-08-21

## 2023-08-21 PROBLEM — M62.82 NON-TRAUMATIC RHABDOMYOLYSIS: Status: RESOLVED | Noted: 2021-07-26 | Resolved: 2023-08-21

## 2023-08-21 PROBLEM — M77.9 ENTHESOPATHY: Status: RESOLVED | Noted: 2021-05-07 | Resolved: 2023-08-21

## 2023-08-21 PROCEDURE — 3074F SYST BP LT 130 MM HG: CPT | Performed by: FAMILY MEDICINE

## 2023-08-21 PROCEDURE — 99396 PREV VISIT EST AGE 40-64: CPT | Performed by: FAMILY MEDICINE

## 2023-08-21 PROCEDURE — 3008F BODY MASS INDEX DOCD: CPT | Performed by: FAMILY MEDICINE

## 2023-08-21 PROCEDURE — 3078F DIAST BP <80 MM HG: CPT | Performed by: FAMILY MEDICINE

## 2023-08-22 ENCOUNTER — LAB ENCOUNTER (OUTPATIENT)
Dept: LAB | Age: 48
End: 2023-08-22
Attending: FAMILY MEDICINE
Payer: COMMERCIAL

## 2023-08-22 DIAGNOSIS — Z00.00 ROUTINE GENERAL MEDICAL EXAMINATION AT HEALTH CARE FACILITY: ICD-10-CM

## 2023-08-22 DIAGNOSIS — Z12.5 SCREENING FOR PROSTATE CANCER: ICD-10-CM

## 2023-08-22 LAB
ALBUMIN SERPL-MCNC: 3.8 G/DL (ref 3.4–5)
ALBUMIN/GLOB SERPL: 1 {RATIO} (ref 1–2)
ALP LIVER SERPL-CCNC: 58 U/L
ALT SERPL-CCNC: 26 U/L
ANION GAP SERPL CALC-SCNC: 4 MMOL/L (ref 0–18)
AST SERPL-CCNC: 19 U/L (ref 15–37)
BASOPHILS # BLD AUTO: 0.01 X10(3) UL (ref 0–0.2)
BASOPHILS NFR BLD AUTO: 0.2 %
BILIRUB SERPL-MCNC: 0.7 MG/DL (ref 0.1–2)
BUN BLD-MCNC: 13 MG/DL (ref 7–18)
CALCIUM BLD-MCNC: 8.9 MG/DL (ref 8.5–10.1)
CHLORIDE SERPL-SCNC: 105 MMOL/L (ref 98–112)
CHOLEST SERPL-MCNC: 196 MG/DL (ref ?–200)
CO2 SERPL-SCNC: 27 MMOL/L (ref 21–32)
COMPLEXED PSA SERPL-MCNC: 0.26 NG/ML (ref ?–4)
CREAT BLD-MCNC: 0.98 MG/DL
EGFRCR SERPLBLD CKD-EPI 2021: 96 ML/MIN/1.73M2 (ref 60–?)
EOSINOPHIL # BLD AUTO: 0.19 X10(3) UL (ref 0–0.7)
EOSINOPHIL NFR BLD AUTO: 4 %
ERYTHROCYTE [DISTWIDTH] IN BLOOD BY AUTOMATED COUNT: 12.5 %
FASTING PATIENT LIPID ANSWER: YES
FASTING STATUS PATIENT QL REPORTED: YES
GLOBULIN PLAS-MCNC: 3.9 G/DL (ref 2.8–4.4)
GLUCOSE BLD-MCNC: 101 MG/DL (ref 70–99)
HCT VFR BLD AUTO: 37.3 %
HDLC SERPL-MCNC: 64 MG/DL (ref 40–59)
HGB BLD-MCNC: 12.9 G/DL
IMM GRANULOCYTES # BLD AUTO: 0.01 X10(3) UL (ref 0–1)
IMM GRANULOCYTES NFR BLD: 0.2 %
LDLC SERPL CALC-MCNC: 100 MG/DL (ref ?–100)
LYMPHOCYTES # BLD AUTO: 1.8 X10(3) UL (ref 1–4)
LYMPHOCYTES NFR BLD AUTO: 37.6 %
MCH RBC QN AUTO: 30.2 PG (ref 26–34)
MCHC RBC AUTO-ENTMCNC: 34.6 G/DL (ref 31–37)
MCV RBC AUTO: 87.4 FL
MONOCYTES # BLD AUTO: 0.51 X10(3) UL (ref 0.1–1)
MONOCYTES NFR BLD AUTO: 10.6 %
NEUTROPHILS # BLD AUTO: 2.27 X10 (3) UL (ref 1.5–7.7)
NEUTROPHILS # BLD AUTO: 2.27 X10(3) UL (ref 1.5–7.7)
NEUTROPHILS NFR BLD AUTO: 47.4 %
NONHDLC SERPL-MCNC: 132 MG/DL (ref ?–130)
OSMOLALITY SERPL CALC.SUM OF ELEC: 282 MOSM/KG (ref 275–295)
PLATELET # BLD AUTO: 237 10(3)UL (ref 150–450)
POTASSIUM SERPL-SCNC: 4.4 MMOL/L (ref 3.5–5.1)
PROT SERPL-MCNC: 7.7 G/DL (ref 6.4–8.2)
RBC # BLD AUTO: 4.27 X10(6)UL
SODIUM SERPL-SCNC: 136 MMOL/L (ref 136–145)
T4 FREE SERPL-MCNC: 0.8 NG/DL (ref 0.8–1.7)
TRIGL SERPL-MCNC: 189 MG/DL (ref 30–149)
TSI SER-ACNC: 3.7 MIU/ML (ref 0.36–3.74)
VLDLC SERPL CALC-MCNC: 32 MG/DL (ref 0–30)
WBC # BLD AUTO: 4.8 X10(3) UL (ref 4–11)

## 2023-08-22 PROCEDURE — 84439 ASSAY OF FREE THYROXINE: CPT | Performed by: FAMILY MEDICINE

## 2023-08-22 PROCEDURE — 84153 ASSAY OF PSA TOTAL: CPT | Performed by: FAMILY MEDICINE

## 2023-08-22 PROCEDURE — 80061 LIPID PANEL: CPT | Performed by: FAMILY MEDICINE

## 2023-08-22 PROCEDURE — 80050 GENERAL HEALTH PANEL: CPT | Performed by: FAMILY MEDICINE

## 2023-08-30 ENCOUNTER — OFFICE VISIT (OUTPATIENT)
Dept: FAMILY MEDICINE CLINIC | Facility: CLINIC | Age: 48
End: 2023-08-30
Payer: COMMERCIAL

## 2023-08-30 VITALS
TEMPERATURE: 98 F | OXYGEN SATURATION: 100 % | WEIGHT: 192 LBS | HEIGHT: 70 IN | HEART RATE: 68 BPM | BODY MASS INDEX: 27.49 KG/M2 | SYSTOLIC BLOOD PRESSURE: 118 MMHG | DIASTOLIC BLOOD PRESSURE: 72 MMHG

## 2023-08-30 DIAGNOSIS — E78.2 MIXED HYPERLIPIDEMIA: ICD-10-CM

## 2023-08-30 DIAGNOSIS — Z87.39 HISTORY OF RHABDOMYOLYSIS: ICD-10-CM

## 2023-08-30 DIAGNOSIS — D64.9 ANEMIA, UNSPECIFIED TYPE: Primary | ICD-10-CM

## 2023-08-30 PROCEDURE — 3008F BODY MASS INDEX DOCD: CPT | Performed by: FAMILY MEDICINE

## 2023-08-30 PROCEDURE — 99213 OFFICE O/P EST LOW 20 MIN: CPT | Performed by: FAMILY MEDICINE

## 2023-08-30 PROCEDURE — 3074F SYST BP LT 130 MM HG: CPT | Performed by: FAMILY MEDICINE

## 2023-08-30 PROCEDURE — 3078F DIAST BP <80 MM HG: CPT | Performed by: FAMILY MEDICINE

## 2023-10-02 NOTE — MR AVS SNAPSHOT
[facility]  [address]  [phone]               Thank you for choosing us for your health care visit with FAISAL Pereira. We are glad to serve you and happy to provide you with this summary of your visit.   Please Please contact pt and help her scheduled her annual with Dr. Pearce and mammogram.    Annual due on or after 11/1  Mammogram due on or after 11/22    Thank you   · Over-the-counter decongestants may be used unless a similar medicine was prescribed. Nasal sprays work the fastest. Use one that contains phenylephrine or oxymetazoline. First blow the nose gently. Then use the spray.  Do not use these medicines more ofte 00719. All rights reserved. This information is not intended as a substitute for professional medical care. Always follow your healthcare professional's instructions.              Allergies as of Jan 27, 2017     No Known Allergies                Today's [facility] Support Staff. Remember, MyChart is NOT to be used for urgent needs. For medical emergencies, dial 911.            Visit [facility] online at  [email or web address]

## 2023-10-24 ENCOUNTER — LAB ENCOUNTER (OUTPATIENT)
Dept: LAB | Age: 48
End: 2023-10-24
Attending: FAMILY MEDICINE

## 2023-10-24 DIAGNOSIS — D64.9 ANEMIA, UNSPECIFIED TYPE: ICD-10-CM

## 2023-10-24 LAB
DEPRECATED HBV CORE AB SER IA-ACNC: 449.5 NG/ML
FOLATE SERPL-MCNC: 25.2 NG/ML (ref 8.7–?)
IRON SATN MFR SERPL: 23 %
IRON SERPL-MCNC: 81 UG/DL
TIBC SERPL-MCNC: 359 UG/DL (ref 240–450)
TRANSFERRIN SERPL-MCNC: 241 MG/DL (ref 200–360)
VIT B12 SERPL-MCNC: 288 PG/ML (ref 193–986)

## 2023-10-24 PROCEDURE — 83550 IRON BINDING TEST: CPT | Performed by: FAMILY MEDICINE

## 2023-10-24 PROCEDURE — 83540 ASSAY OF IRON: CPT | Performed by: FAMILY MEDICINE

## 2023-10-24 PROCEDURE — 82607 VITAMIN B-12: CPT | Performed by: FAMILY MEDICINE

## 2023-10-24 PROCEDURE — 82746 ASSAY OF FOLIC ACID SERUM: CPT | Performed by: FAMILY MEDICINE

## 2023-10-24 PROCEDURE — 82728 ASSAY OF FERRITIN: CPT | Performed by: FAMILY MEDICINE

## 2024-08-14 ENCOUNTER — OFFICE VISIT (OUTPATIENT)
Dept: FAMILY MEDICINE CLINIC | Facility: CLINIC | Age: 49
End: 2024-08-14
Payer: COMMERCIAL

## 2024-08-14 VITALS
OXYGEN SATURATION: 98 % | TEMPERATURE: 98 F | HEART RATE: 95 BPM | SYSTOLIC BLOOD PRESSURE: 114 MMHG | DIASTOLIC BLOOD PRESSURE: 76 MMHG | BODY MASS INDEX: 24.87 KG/M2 | HEIGHT: 68.39 IN | WEIGHT: 166 LBS | RESPIRATION RATE: 18 BRPM

## 2024-08-14 DIAGNOSIS — M25.78 DEGENERATION OF INTERVERTEBRAL DISC OF CERVICAL REGION WITH OSTEOPHYTE OF CERVICAL VERTEBRA: ICD-10-CM

## 2024-08-14 DIAGNOSIS — M54.2 CERVICALGIA: ICD-10-CM

## 2024-08-14 DIAGNOSIS — Z00.00 ROUTINE GENERAL MEDICAL EXAMINATION AT A HEALTH CARE FACILITY: Primary | ICD-10-CM

## 2024-08-14 DIAGNOSIS — R20.2 PARESTHESIA OF LEFT UPPER EXTREMITY: ICD-10-CM

## 2024-08-14 DIAGNOSIS — M50.30 DEGENERATION OF INTERVERTEBRAL DISC OF CERVICAL REGION WITH OSTEOPHYTE OF CERVICAL VERTEBRA: ICD-10-CM

## 2024-08-14 DIAGNOSIS — M54.12 CERVICAL RADICULOPATHY: ICD-10-CM

## 2024-08-14 DIAGNOSIS — Z12.5 SCREENING FOR PROSTATE CANCER: ICD-10-CM

## 2024-08-14 DIAGNOSIS — M19.012 ARTHRITIS OF LEFT ACROMIOCLAVICULAR JOINT: ICD-10-CM

## 2024-08-14 PROCEDURE — 3008F BODY MASS INDEX DOCD: CPT | Performed by: FAMILY MEDICINE

## 2024-08-14 PROCEDURE — 99396 PREV VISIT EST AGE 40-64: CPT | Performed by: FAMILY MEDICINE

## 2024-08-14 PROCEDURE — 99214 OFFICE O/P EST MOD 30 MIN: CPT | Performed by: FAMILY MEDICINE

## 2024-08-14 PROCEDURE — 3078F DIAST BP <80 MM HG: CPT | Performed by: FAMILY MEDICINE

## 2024-08-14 PROCEDURE — 3074F SYST BP LT 130 MM HG: CPT | Performed by: FAMILY MEDICINE

## 2024-08-14 RX ORDER — METHYLPREDNISOLONE 4 MG/1
TABLET ORAL
Qty: 1 EACH | Refills: 0 | Status: SHIPPED | OUTPATIENT
Start: 2024-08-14

## 2024-08-14 NOTE — PROGRESS NOTES
Miguel Angel Fairbanks is a 48 year old male       Chief Complaint   Patient presents with    Wellness Visit    Neck Pain     C/o since march    Tingling     C/o neck since march           HPI:       Neck pain:  Starting a month later associated with numbness and pins and needles sensation of left UE, intermittent, ppt'd by certain positions such as with driving with left arm extended.  Pain up 2/10 most recently.  Certain angles.  Nature of the neck pain is achy.        Colonoscopy:   UTD.    Wt Readings from Last 6 Encounters:   08/14/24 166 lb (75.3 kg)   08/30/23 192 lb (87.1 kg)   08/21/23 192 lb (87.1 kg)   01/19/22 195 lb (88.5 kg)   09/14/21 190 lb (86.2 kg)   08/19/21 185 lb (83.9 kg)     Body mass index is 24.96 kg/m².           Patient Active Problem List   Diagnosis    Mixed hyperlipidemia    Chronic pain of both ankles    Chronic foot pain, left    Chronic foot pain, right    Idiopathic chronic gout of multiple sites without tophus    Overweight with body mass index (BMI) of 27 to 27.9 in adult    Musculoskeletal back pain    Anemia    Colon polyp    History of rhabdomyolysis    Cervicalgia    Cervical radiculopathy    Paresthesia of left upper extremity    Degeneration of intervertebral disc of cervical region with osteophyte of cervical vertebra    Arthritis of left acromioclavicular joint     Current Outpatient Medications   Medication Sig Dispense Refill    methylPREDNISolone 4 MG Oral Tablet Therapy Pack Take each day's dose as one daily dose q am with food or milk for 6 days. 1 each 0    allopurinol 100 MG Oral Tab Take 2 tablets (200 mg total) by mouth daily. 180 tablet 3    Multiple Vitamins-Minerals (TAB-A-RADHA) Oral Tab Take 1 tablet by mouth daily.      Glucosamine-Chondroitin (MOVE FREE OR) Take 2 tablets by mouth once daily.        Past Medical History:    Capsulitis of foot, left    Chronic pain of both ankles    Intermittent     Enthesopathy    Possible enthesopathy or muscle or attachment  strain of right hamstring.    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 No pertinent surgical history.   Family History   Problem Relation Age of Onset    Heart Attack Paternal Grandmother     Heart Attack Maternal Grandfather     Other (Other) Father         gout      Social History:  Social History     Socioeconomic History    Marital status:    Tobacco Use    Smoking status: Never    Smokeless tobacco: Never   Vaping Use    Vaping status: Never Used   Substance and Sexual Activity    Alcohol use: Yes     Alcohol/week: 5.0 standard drinks of alcohol     Types: 6 Cans of beer per week     Comment: social    Drug use: No   Other Topics Concern    Caffeine Concern No    Exercise Yes     Comment: 3-5 days weekly when able    Seat Belt Yes        Occ: .  Marital Status: . Children: 4.   Exercise:  running occ rooking, strength training, hiking .    Diet: watches fats closely and watches sugar closely     REVIEW OF SYSTEMS:   GENERAL: Feels well overall. Denies fever or chills.   SKIN: Denies any new or changing skin lesions.  EYES: Denies changes in vision.  HENT: Denies upper respiratory symptoms.  LUNGS: Denies MEIR, wheezing, cough.  Dyspnea on exertion.  CARDIOVASCULAR: Denies CP or palpitations. Denies chest pain on exertion.  GI: Denies abdominal pain, denies heartburn, denies n/v/c/d/change in stools/blood in stool/black stool/change in appetite  : Denies nocturia or changes in urinary stream. Denies scrotal mass/abnormal discharge from urethra.  MUSCULOSKELETAL: As in HPI  NEURO: Denies headaches/dizziness  PSYCH: Denies depression or anxiety  HEMATOLOGIC: No complaints of easy bruising/bleeding/anemia/blood  clot disorders  ENDOCRINE: No complaints of enlarged neck glands/polyuria/polydypsia.      EXAM:   /76   Pulse 95   Temp 97.8 °F (36.6 °C) (Temporal)   Resp 18   Ht 5' 8.39\" (1.737 m)   Wt 166 lb (75.3 kg)   SpO2 98%   BMI 24.96 kg/m²   Body mass index is 24.96 kg/m².   GENERAL: NAD. Pleasant, well developed, nonill appearing male  SKIN: No visible rashes.  No visible suspicious lesions.  HENT: NCAT, EACs clear b/l, TMs normal b/l.  Nose: No nasal discharge.  OP: MMM.  Posteriorly no exudate or erythema.  EYES: PERRL.  EOMI.  Conjunctiva non-injected.  Non-icteric.  NECK: Supple. No cervical or supraclavicular adenopathy, no thyromegaly/thyroid nodules appreciated, no masses  LUNGS: CTA A/P, no wheezes/ronchi/rales/crackles, normal air excursion  CARDIOVASCULAR: RRR, no murmur.  No lower extremity edema.  Abdomen: Flat.  Non-tender to palpation, no HSM/masses/pulsations  MUSCULOSKELETAL: Cervical spine: Decreased active range of motion.  No point tenderness of spinous processes or transverse processes.  EXTREMITIES: No cyanosis, clubbing, or edema  NEURO: A&O x3.  No gross motor or gross sensory abnormality.  PSYCH: Normal affect. No apparent thought disorder. Average judgement and insight.    Immunization History   Administered Date(s) Administered    Covid-19 Vaccine Moderna 100 mcg/0.5 ml 04/08/2021, 05/05/2021, 12/07/2021    Covid-19 Vaccine Moderna Bivalent 50mcg/0.5mL 12+ years 01/16/2023    FLULAVAL 6 months & older 0.5 ml Prefilled syringe ([id number]) 11/01/2018, 11/03/2020    Fluvirin, 3 Years & >, Im 10/30/2012    Moderna Covid-19 Vaccine 50mcg/0.5ml 12yrs+ ([id number]) 10/24/2023    TDAP 07/03/2006, 06/08/2015         DATA:      Cholesterol:     Lab Results   Component Value Date    CHOLEST 196 08/22/2023    CHOLEST 202 (H) 05/28/2021    CHOLEST 215 (H) 06/22/2019     Lab Results   Component Value Date    HDL 64 (H) 08/22/2023    HDL 62 (H) 05/28/2021    HDL 69 06/22/2019     Lab Results    Component Value Date    TRIG 189 (H) 08/22/2023    TRIG 147 05/28/2021    TRIG 132 06/22/2019     Lab Results   Component Value Date     (H) 08/22/2023     (H) 05/28/2021     (H) 06/22/2019     Lab Results   Component Value Date    AST 19 08/22/2023    AST 15 07/21/2022    AST 16 12/01/2021     Lab Results   Component Value Date    ALT 26 08/22/2023    ALT 16 07/21/2022    ALT 22 12/01/2021         X-ray cervical spine 2 or 3 views    Anatomical Region Laterality Modality   C-spine -- Computed Radiography     Narrative    EXAM: CERVICAL SPINE RADIOGRAPHS      HISTORY: Neck pain.    TECHNIQUE: AP, lateral, and odontoid views of the cervical spine.    COMPARISON: None    FINDINGS:    Straightening of the normal cervical lordosis.  Mild degenerative disc disease of C4-C6 with small anterior osteophytes.  No fracture.  No significant facet or uncovertebral joint arthropathy.  Normal odontoid and lateral masses on dedicated view.  Mild degenerative changes of the atlanto-axial joint.  No prevertebral soft tissue swelling.  Lung apices are clear.      IMPRESSION:    Mild degenerative changes of C4-C6.  ____________________________________    Electronically signed by: BROOKE AVILA D.O.  Date:     04/22/2024  Time:    12:29              X-ray shoulder left 2+ views    Anatomical Region Laterality Modality   Shoulder left Computed Radiography     Narrative    EXAM: SHOULDER RADIOGRAPHS      HISTORY: Left shoulder pain.    TECHNIQUE: Internal and external rotation and scapular Y views of the left shoulder.    COMPARISON: None    FINDINGS:    No fracture or dislocation.  Glenohumeral joint spaces maintained.  Minimal degenerative changes of the acromioclavicular joint with tiny osteophytes.  Normal acromiohumeral interval.  Visualized left lung is clear.      IMPRESSION:    Minimal acromioclavicular joint osteoarthritis.  _____________________________________    Electronically signed by: BROOKE AVILA  CARIDAD.  Date:     04/22/2024  Time:    12:30        ASSESSMENT AND PLAN:   Miguel Angel Fairbanks is a 48 year old male who presents for a complete physical exam.       Encounter Diagnoses   Name Primary?    Routine general medical examination at a health care facility Yes    Screening for prostate cancer     Cervicalgia     Cervical radiculopathy     Paresthesia of left upper extremity     Degeneration of intervertebral disc of cervical region with osteophyte of cervical vertebra     Arthritis of left acromioclavicular joint            1. Routine general medical examination at a health care facility  Patient provided handout on men's health and prevention.    Routine health profile labs pending.  Recommend healthy diet including green leafy vegetables, fresh fruits and protein.  Aerobic exercise for total of 150 minutes/week is recommended for cardiovascular fitness.  Patient encouraged to continue with healthy lifestyle.    - CBC With Differential With Platelet; Future  - Comp Metabolic Panel (14); Future  - Lipid Panel; Future  - Assay, Thyroid Stim Hormone; Future  - Free T4, (Free Thyroxine); Future    2. Screening for prostate cancer  Labs due on or shortly after 8/23/2024.  - PSA, Total (Screening) [E]; Future      3. Cervicalgia  4. Cervical radiculopathy  5. Paresthesia of left upper extremity  6. Degeneration of intervertebral disc of cervical region with osteophyte of cervical vertebra  X-ray reports from 4/24/2024 read and reviewed.  Intermittent numbness and tingling of left upper extremity arm and hand/fingers. Positional.   Paresthesias likely secondary to cervical radiculopathy, cannot rule out other.  Recommend trial of Medrol Dosepak.  Patient is a  and cannot take muscle relaxants.  Recommend trial of physical therapy, patient currently living in [facility], external order for physical therapy provided.  I reviewed possible locations in [facility] for physical therapy  with patient, would recommend [place] or the physical therapy location associated with the local hospital/SICU.  I discussed with patient if symptoms of the paresthesias do not resolve with physical therapy or if they recur to let me know, would refer to pain specialist as next step, Dr. Gant and colleagues.    - methylPREDNISolone 4 MG Oral Tablet Therapy Pack; Take each day's dose as one daily dose q am with food or milk for 6 days.  Dispense: 1 each; Refill: 0  - Physical Therapy Referral - External      7. Arthritis of left acromioclavicular joint  Currently not having shoulder pain.          Orders Placed This Encounter   Procedures    PSA, Total (Screening) [E]    CBC With Differential With Platelet    Comp Metabolic Panel (14)    Lipid Panel    Assay, Thyroid Stim Hormone    Free T4, (Free Thyroxine)       Meds & Refills for this Visit:  Requested Prescriptions     Signed Prescriptions Disp Refills    methylPREDNISolone 4 MG Oral Tablet Therapy Pack 1 each 0     Sig: Take each day's dose as one daily dose q am with food or milk for 6 days.       Imaging & Consults:  PHYSICAL THERAPY EXTERNAL      Return in about 1 year (around 8/14/2025) for Annual wellness visit.  Sooner if needed.

## 2024-09-21 ENCOUNTER — LAB ENCOUNTER (OUTPATIENT)
Dept: LAB | Age: 49
End: 2024-09-21
Attending: FAMILY MEDICINE
Payer: COMMERCIAL

## 2024-09-21 DIAGNOSIS — Z00.00 ROUTINE GENERAL MEDICAL EXAMINATION AT A HEALTH CARE FACILITY: ICD-10-CM

## 2024-09-21 DIAGNOSIS — Z12.5 SCREENING FOR PROSTATE CANCER: ICD-10-CM

## 2024-09-21 LAB
ALBUMIN SERPL-MCNC: 4.5 G/DL (ref 3.2–4.8)
ALBUMIN/GLOB SERPL: 1.9 {RATIO} (ref 1–2)
ALP LIVER SERPL-CCNC: 63 U/L
ALT SERPL-CCNC: 11 U/L
ANION GAP SERPL CALC-SCNC: 4 MMOL/L (ref 0–18)
AST SERPL-CCNC: 13 U/L (ref ?–34)
BASOPHILS # BLD AUTO: 0.02 X10(3) UL (ref 0–0.2)
BASOPHILS NFR BLD AUTO: 0.4 %
BILIRUB SERPL-MCNC: 0.3 MG/DL (ref 0.3–1.2)
BUN BLD-MCNC: 16 MG/DL (ref 9–23)
CALCIUM BLD-MCNC: 9.3 MG/DL (ref 8.7–10.4)
CHLORIDE SERPL-SCNC: 109 MMOL/L (ref 98–112)
CHOLEST SERPL-MCNC: 185 MG/DL (ref ?–200)
CO2 SERPL-SCNC: 27 MMOL/L (ref 21–32)
COMPLEXED PSA SERPL-MCNC: 0.43 NG/ML (ref ?–4)
CREAT BLD-MCNC: 1.02 MG/DL
EGFRCR SERPLBLD CKD-EPI 2021: 91 ML/MIN/1.73M2 (ref 60–?)
EOSINOPHIL # BLD AUTO: 0.12 X10(3) UL (ref 0–0.7)
EOSINOPHIL NFR BLD AUTO: 2.6 %
ERYTHROCYTE [DISTWIDTH] IN BLOOD BY AUTOMATED COUNT: 12.9 %
FASTING PATIENT LIPID ANSWER: YES
FASTING STATUS PATIENT QL REPORTED: YES
GLOBULIN PLAS-MCNC: 2.4 G/DL (ref 2–3.5)
GLUCOSE BLD-MCNC: 107 MG/DL (ref 70–99)
HCT VFR BLD AUTO: 37.7 %
HDLC SERPL-MCNC: 58 MG/DL (ref 40–59)
HGB BLD-MCNC: 12.5 G/DL
IMM GRANULOCYTES # BLD AUTO: 0.01 X10(3) UL (ref 0–1)
IMM GRANULOCYTES NFR BLD: 0.2 %
LDLC SERPL CALC-MCNC: 112 MG/DL (ref ?–100)
LYMPHOCYTES # BLD AUTO: 1.52 X10(3) UL (ref 1–4)
LYMPHOCYTES NFR BLD AUTO: 33.3 %
MCH RBC QN AUTO: 30.1 PG (ref 26–34)
MCHC RBC AUTO-ENTMCNC: 33.2 G/DL (ref 31–37)
MCV RBC AUTO: 90.8 FL
MONOCYTES # BLD AUTO: 0.49 X10(3) UL (ref 0.1–1)
MONOCYTES NFR BLD AUTO: 10.7 %
NEUTROPHILS # BLD AUTO: 2.41 X10 (3) UL (ref 1.5–7.7)
NEUTROPHILS # BLD AUTO: 2.41 X10(3) UL (ref 1.5–7.7)
NEUTROPHILS NFR BLD AUTO: 52.8 %
NONHDLC SERPL-MCNC: 127 MG/DL (ref ?–130)
OSMOLALITY SERPL CALC.SUM OF ELEC: 292 MOSM/KG (ref 275–295)
PLATELET # BLD AUTO: 210 10(3)UL (ref 150–450)
POTASSIUM SERPL-SCNC: 4.4 MMOL/L (ref 3.5–5.1)
PROT SERPL-MCNC: 6.9 G/DL (ref 5.7–8.2)
RBC # BLD AUTO: 4.15 X10(6)UL
SODIUM SERPL-SCNC: 140 MMOL/L (ref 136–145)
T4 FREE SERPL-MCNC: 1.3 NG/DL (ref 0.8–1.7)
TRIGL SERPL-MCNC: 81 MG/DL (ref 30–149)
TSI SER-ACNC: 2.42 MIU/ML (ref 0.55–4.78)
VLDLC SERPL CALC-MCNC: 14 MG/DL (ref 0–30)
WBC # BLD AUTO: 4.6 X10(3) UL (ref 4–11)

## 2024-09-21 PROCEDURE — 85025 COMPLETE CBC W/AUTO DIFF WBC: CPT

## 2024-09-21 PROCEDURE — 84439 ASSAY OF FREE THYROXINE: CPT

## 2024-09-21 PROCEDURE — 80053 COMPREHEN METABOLIC PANEL: CPT

## 2024-09-21 PROCEDURE — 80061 LIPID PANEL: CPT

## 2024-09-21 PROCEDURE — 84443 ASSAY THYROID STIM HORMONE: CPT

## 2024-09-28 DIAGNOSIS — D64.9 ANEMIA, UNSPECIFIED TYPE: Primary | ICD-10-CM

## 2024-10-05 ENCOUNTER — LAB ENCOUNTER (OUTPATIENT)
Dept: LAB | Age: 49
End: 2024-10-05
Attending: FAMILY MEDICINE
Payer: COMMERCIAL

## 2024-10-05 DIAGNOSIS — D64.9 ANEMIA, UNSPECIFIED TYPE: ICD-10-CM

## 2024-10-05 LAB
DEPRECATED HBV CORE AB SER IA-ACNC: 302 NG/ML
FOLATE SERPL-MCNC: 29.7 NG/ML (ref 5.4–?)
IRON SATN MFR SERPL: 17 %
IRON SERPL-MCNC: 54 UG/DL
TOTAL IRON BINDING CAPACITY: 309 UG/DL (ref 250–425)
TRANSFERRIN SERPL-MCNC: 228 MG/DL (ref 215–365)
VIT B12 SERPL-MCNC: 675 PG/ML (ref 211–911)

## 2024-10-05 PROCEDURE — 82607 VITAMIN B-12: CPT

## 2024-10-05 PROCEDURE — 83550 IRON BINDING TEST: CPT

## 2024-10-05 PROCEDURE — 36415 COLL VENOUS BLD VENIPUNCTURE: CPT

## 2024-10-05 PROCEDURE — 82728 ASSAY OF FERRITIN: CPT

## 2024-10-05 PROCEDURE — 83540 ASSAY OF IRON: CPT

## 2024-10-05 PROCEDURE — 82746 ASSAY OF FOLIC ACID SERUM: CPT

## 2024-10-06 DIAGNOSIS — E61.1 IRON DEFICIENCY: Primary | ICD-10-CM

## 2024-11-26 ENCOUNTER — LAB ENCOUNTER (OUTPATIENT)
Dept: LAB | Age: 49
End: 2024-11-26
Attending: INTERNAL MEDICINE
Payer: COMMERCIAL

## 2024-11-26 DIAGNOSIS — M1A.09X0 IDIOPATHIC CHRONIC GOUT OF MULTIPLE SITES WITHOUT TOPHUS: Primary | ICD-10-CM

## 2024-11-26 LAB
ALBUMIN SERPL-MCNC: 4.3 G/DL (ref 3.2–4.8)
ALP LIVER SERPL-CCNC: 67 U/L
ALT SERPL-CCNC: 12 U/L
AST SERPL-CCNC: 21 U/L (ref ?–34)
BASOPHILS # BLD AUTO: 0.02 X10(3) UL (ref 0–0.2)
BASOPHILS NFR BLD AUTO: 0.3 %
BILIRUB DIRECT SERPL-MCNC: 0.1 MG/DL (ref ?–0.3)
BILIRUB SERPL-MCNC: 0.5 MG/DL (ref 0.3–1.2)
CREAT BLD-MCNC: 1.02 MG/DL
EGFRCR SERPLBLD CKD-EPI 2021: 91 ML/MIN/1.73M2 (ref 60–?)
EOSINOPHIL # BLD AUTO: 0.2 X10(3) UL (ref 0–0.7)
EOSINOPHIL NFR BLD AUTO: 3.4 %
ERYTHROCYTE [DISTWIDTH] IN BLOOD BY AUTOMATED COUNT: 12.2 %
HCT VFR BLD AUTO: 39.5 %
HGB BLD-MCNC: 13.3 G/DL
IMM GRANULOCYTES # BLD AUTO: 0.01 X10(3) UL (ref 0–1)
IMM GRANULOCYTES NFR BLD: 0.2 %
LYMPHOCYTES # BLD AUTO: 2.02 X10(3) UL (ref 1–4)
LYMPHOCYTES NFR BLD AUTO: 34.4 %
MCH RBC QN AUTO: 29.9 PG (ref 26–34)
MCHC RBC AUTO-ENTMCNC: 33.7 G/DL (ref 31–37)
MCV RBC AUTO: 88.8 FL
MONOCYTES # BLD AUTO: 0.6 X10(3) UL (ref 0.1–1)
MONOCYTES NFR BLD AUTO: 10.2 %
NEUTROPHILS # BLD AUTO: 3.03 X10 (3) UL (ref 1.5–7.7)
NEUTROPHILS # BLD AUTO: 3.03 X10(3) UL (ref 1.5–7.7)
NEUTROPHILS NFR BLD AUTO: 51.5 %
PLATELET # BLD AUTO: 227 10(3)UL (ref 150–450)
PROT SERPL-MCNC: 7.5 G/DL (ref 5.7–8.2)
RBC # BLD AUTO: 4.45 X10(6)UL
URATE SERPL-MCNC: 5.8 MG/DL
WBC # BLD AUTO: 5.9 X10(3) UL (ref 4–11)

## 2024-11-26 PROCEDURE — 84550 ASSAY OF BLOOD/URIC ACID: CPT

## 2024-11-26 PROCEDURE — 82565 ASSAY OF CREATININE: CPT

## 2024-11-26 PROCEDURE — 80076 HEPATIC FUNCTION PANEL: CPT

## 2024-11-26 PROCEDURE — 85025 COMPLETE CBC W/AUTO DIFF WBC: CPT

## 2024-11-26 PROCEDURE — 36415 COLL VENOUS BLD VENIPUNCTURE: CPT

## 2025-05-12 PROBLEM — Z86.0101 PERSONAL HISTORY OF ADENOMATOUS AND SERRATED COLON POLYPS: Status: ACTIVE | Noted: 2025-05-12

## 2025-05-12 PROBLEM — D50.9 IRON DEFICIENCY ANEMIA, UNSPECIFIED: Status: ACTIVE | Noted: 2025-05-12

## 2025-05-12 PROBLEM — R49.8 VOCAL FATIGUE: Status: ACTIVE | Noted: 2025-05-12

## 2025-07-21 ENCOUNTER — LAB ENCOUNTER (OUTPATIENT)
Dept: LAB | Age: 50
End: 2025-07-21
Attending: STUDENT IN AN ORGANIZED HEALTH CARE EDUCATION/TRAINING PROGRAM
Payer: COMMERCIAL

## 2025-07-21 DIAGNOSIS — A04.8 H. PYLORI INFECTION: ICD-10-CM

## 2025-07-21 PROCEDURE — 87338 HPYLORI STOOL AG IA: CPT

## 2025-07-22 LAB — H PYLORI AG STL QL IA: NEGATIVE
